# Patient Record
Sex: FEMALE | HISPANIC OR LATINO | Employment: FULL TIME | URBAN - METROPOLITAN AREA
[De-identification: names, ages, dates, MRNs, and addresses within clinical notes are randomized per-mention and may not be internally consistent; named-entity substitution may affect disease eponyms.]

---

## 2016-12-21 LAB — PAP SMEAR - HIM PATIENT REPORTED: NORMAL

## 2017-02-14 ENCOUNTER — COMMUNICATION - HEALTHEAST (OUTPATIENT)
Dept: FAMILY MEDICINE | Facility: CLINIC | Age: 22
End: 2017-02-14

## 2017-02-15 ENCOUNTER — AMBULATORY - HEALTHEAST (OUTPATIENT)
Dept: FAMILY MEDICINE | Facility: CLINIC | Age: 22
End: 2017-02-15

## 2017-02-15 DIAGNOSIS — F41.9 ANXIETY: ICD-10-CM

## 2017-03-03 ENCOUNTER — COMMUNICATION - HEALTHEAST (OUTPATIENT)
Dept: FAMILY MEDICINE | Facility: CLINIC | Age: 22
End: 2017-03-03

## 2017-03-03 DIAGNOSIS — F33.1 MAJOR DEPRESSIVE DISORDER, RECURRENT EPISODE, MODERATE (H): ICD-10-CM

## 2017-03-21 ENCOUNTER — OFFICE VISIT - HEALTHEAST (OUTPATIENT)
Dept: FAMILY MEDICINE | Facility: CLINIC | Age: 22
End: 2017-03-21

## 2017-03-21 DIAGNOSIS — F50.819 BINGE EATING DISORDER: ICD-10-CM

## 2017-03-21 DIAGNOSIS — I10 ESSENTIAL HYPERTENSION WITH GOAL BLOOD PRESSURE LESS THAN 130/80: ICD-10-CM

## 2017-03-21 DIAGNOSIS — F33.1 MAJOR DEPRESSIVE DISORDER, RECURRENT EPISODE, MODERATE (H): ICD-10-CM

## 2017-03-21 DIAGNOSIS — F41.9 ANXIETY: ICD-10-CM

## 2017-03-21 DIAGNOSIS — F50.9: ICD-10-CM

## 2017-03-21 DIAGNOSIS — T74.22XS CONFIRMED VICTIM OF SEXUAL ABUSE IN CHILDHOOD, SEQUELA: ICD-10-CM

## 2017-03-21 DIAGNOSIS — G47.33 OBSTRUCTIVE SLEEP APNEA SYNDROME: ICD-10-CM

## 2017-03-21 ASSESSMENT — MIFFLIN-ST. JEOR: SCORE: 2134.56

## 2017-04-01 ENCOUNTER — COMMUNICATION - HEALTHEAST (OUTPATIENT)
Dept: FAMILY MEDICINE | Facility: CLINIC | Age: 22
End: 2017-04-01

## 2017-04-24 ENCOUNTER — COMMUNICATION - HEALTHEAST (OUTPATIENT)
Dept: FAMILY MEDICINE | Facility: CLINIC | Age: 22
End: 2017-04-24

## 2017-06-26 ENCOUNTER — OFFICE VISIT - HEALTHEAST (OUTPATIENT)
Dept: FAMILY MEDICINE | Facility: CLINIC | Age: 22
End: 2017-06-26

## 2017-06-26 DIAGNOSIS — G47.33 OBSTRUCTIVE SLEEP APNEA SYNDROME: ICD-10-CM

## 2017-06-26 DIAGNOSIS — T74.22XS CONFIRMED VICTIM OF SEXUAL ABUSE IN CHILDHOOD, SEQUELA: ICD-10-CM

## 2017-06-26 DIAGNOSIS — F41.9 ANXIETY: ICD-10-CM

## 2017-06-26 DIAGNOSIS — E55.9 VITAMIN D DEFICIENCY: ICD-10-CM

## 2017-06-26 DIAGNOSIS — I10 ESSENTIAL HYPERTENSION WITH GOAL BLOOD PRESSURE LESS THAN 130/80: ICD-10-CM

## 2017-06-26 DIAGNOSIS — F50.819 BINGE EATING DISORDER: ICD-10-CM

## 2017-06-26 DIAGNOSIS — F33.1 MAJOR DEPRESSIVE DISORDER, RECURRENT EPISODE, MODERATE (H): ICD-10-CM

## 2017-06-26 DIAGNOSIS — E66.01 MORBID OBESITY, UNSPECIFIED OBESITY TYPE (H): ICD-10-CM

## 2017-07-14 ENCOUNTER — COMMUNICATION - HEALTHEAST (OUTPATIENT)
Dept: FAMILY MEDICINE | Facility: CLINIC | Age: 22
End: 2017-07-14

## 2017-08-01 ENCOUNTER — COMMUNICATION - HEALTHEAST (OUTPATIENT)
Dept: FAMILY MEDICINE | Facility: CLINIC | Age: 22
End: 2017-08-01

## 2017-08-25 ENCOUNTER — COMMUNICATION - HEALTHEAST (OUTPATIENT)
Dept: FAMILY MEDICINE | Facility: CLINIC | Age: 22
End: 2017-08-25

## 2017-08-28 ENCOUNTER — RECORDS - HEALTHEAST (OUTPATIENT)
Dept: ADMINISTRATIVE | Facility: OTHER | Age: 22
End: 2017-08-28

## 2017-10-10 ENCOUNTER — COMMUNICATION - HEALTHEAST (OUTPATIENT)
Dept: FAMILY MEDICINE | Facility: CLINIC | Age: 22
End: 2017-10-10

## 2017-10-11 ENCOUNTER — AMBULATORY - HEALTHEAST (OUTPATIENT)
Dept: FAMILY MEDICINE | Facility: CLINIC | Age: 22
End: 2017-10-11

## 2017-10-11 DIAGNOSIS — F33.41 RECURRENT MAJOR DEPRESSIVE DISORDER, IN PARTIAL REMISSION (H): ICD-10-CM

## 2017-10-13 ENCOUNTER — COMMUNICATION - HEALTHEAST (OUTPATIENT)
Dept: FAMILY MEDICINE | Facility: CLINIC | Age: 22
End: 2017-10-13

## 2017-10-18 ENCOUNTER — OFFICE VISIT - HEALTHEAST (OUTPATIENT)
Dept: FAMILY MEDICINE | Facility: CLINIC | Age: 22
End: 2017-10-18

## 2017-10-18 DIAGNOSIS — L42 PITYRIASIS ROSEA: ICD-10-CM

## 2017-10-18 ASSESSMENT — MIFFLIN-ST. JEOR: SCORE: 2192.39

## 2017-12-10 ENCOUNTER — COMMUNICATION - HEALTHEAST (OUTPATIENT)
Dept: FAMILY MEDICINE | Facility: CLINIC | Age: 22
End: 2017-12-10

## 2017-12-10 DIAGNOSIS — F33.1 MAJOR DEPRESSIVE DISORDER, RECURRENT EPISODE, MODERATE (H): ICD-10-CM

## 2017-12-13 ENCOUNTER — COMMUNICATION - HEALTHEAST (OUTPATIENT)
Dept: FAMILY MEDICINE | Facility: CLINIC | Age: 22
End: 2017-12-13

## 2017-12-13 DIAGNOSIS — E55.9 VITAMIN D DEFICIENCY: ICD-10-CM

## 2018-01-04 ENCOUNTER — COMMUNICATION - HEALTHEAST (OUTPATIENT)
Dept: FAMILY MEDICINE | Facility: CLINIC | Age: 23
End: 2018-01-04

## 2018-01-04 DIAGNOSIS — I10 ESSENTIAL HYPERTENSION: ICD-10-CM

## 2018-03-13 ENCOUNTER — COMMUNICATION - HEALTHEAST (OUTPATIENT)
Dept: FAMILY MEDICINE | Facility: CLINIC | Age: 23
End: 2018-03-13

## 2018-03-15 ENCOUNTER — COMMUNICATION - HEALTHEAST (OUTPATIENT)
Dept: FAMILY MEDICINE | Facility: CLINIC | Age: 23
End: 2018-03-15

## 2018-03-20 ENCOUNTER — COMMUNICATION - HEALTHEAST (OUTPATIENT)
Dept: FAMILY MEDICINE | Facility: CLINIC | Age: 23
End: 2018-03-20

## 2018-06-01 ENCOUNTER — RECORDS - HEALTHEAST (OUTPATIENT)
Dept: ADMINISTRATIVE | Facility: OTHER | Age: 23
End: 2018-06-01

## 2018-06-01 ENCOUNTER — COMMUNICATION - HEALTHEAST (OUTPATIENT)
Dept: FAMILY MEDICINE | Facility: CLINIC | Age: 23
End: 2018-06-01

## 2018-06-11 ENCOUNTER — OFFICE VISIT - HEALTHEAST (OUTPATIENT)
Dept: FAMILY MEDICINE | Facility: CLINIC | Age: 23
End: 2018-06-11

## 2018-06-11 DIAGNOSIS — R42 DIZZINESS: ICD-10-CM

## 2018-06-11 LAB
ERYTHROCYTE [DISTWIDTH] IN BLOOD BY AUTOMATED COUNT: 12.4 % (ref 11–14.5)
HCT VFR BLD AUTO: 40.3 % (ref 35–47)
HGB BLD-MCNC: 13.6 G/DL (ref 12–16)
MCH RBC QN AUTO: 29.2 PG (ref 27–34)
MCHC RBC AUTO-ENTMCNC: 33.7 G/DL (ref 32–36)
MCV RBC AUTO: 87 FL (ref 80–100)
PLATELET # BLD AUTO: 308 THOU/UL (ref 140–440)
PMV BLD AUTO: 7.8 FL (ref 7–10)
RBC # BLD AUTO: 4.65 MILL/UL (ref 3.8–5.4)
WBC: 8.9 THOU/UL (ref 4–11)

## 2018-06-11 ASSESSMENT — MIFFLIN-ST. JEOR: SCORE: 2177.65

## 2018-06-12 ENCOUNTER — COMMUNICATION - HEALTHEAST (OUTPATIENT)
Dept: FAMILY MEDICINE | Facility: CLINIC | Age: 23
End: 2018-06-12

## 2018-06-12 DIAGNOSIS — E55.9 VITAMIN D DEFICIENCY: ICD-10-CM

## 2018-06-12 LAB
ALBUMIN SERPL-MCNC: 3.9 G/DL (ref 3.5–5)
ALP SERPL-CCNC: 93 U/L (ref 45–120)
ALT SERPL W P-5'-P-CCNC: 31 U/L (ref 0–45)
ANION GAP SERPL CALCULATED.3IONS-SCNC: 11 MMOL/L (ref 5–18)
AST SERPL W P-5'-P-CCNC: 20 U/L (ref 0–40)
BILIRUB SERPL-MCNC: 0.5 MG/DL (ref 0–1)
BUN SERPL-MCNC: 12 MG/DL (ref 8–22)
CALCIUM SERPL-MCNC: 9.7 MG/DL (ref 8.5–10.5)
CHLORIDE BLD-SCNC: 105 MMOL/L (ref 98–107)
CO2 SERPL-SCNC: 26 MMOL/L (ref 22–31)
CREAT SERPL-MCNC: 0.78 MG/DL (ref 0.6–1.1)
GFR SERPL CREATININE-BSD FRML MDRD: >60 ML/MIN/1.73M2
GLUCOSE BLD-MCNC: 88 MG/DL (ref 70–125)
POTASSIUM BLD-SCNC: 4.7 MMOL/L (ref 3.5–5)
PROT SERPL-MCNC: 7 G/DL (ref 6–8)
SODIUM SERPL-SCNC: 142 MMOL/L (ref 136–145)
TSH SERPL DL<=0.005 MIU/L-ACNC: 1.47 UIU/ML (ref 0.3–5)
VIT B12 SERPL-MCNC: 480 PG/ML (ref 213–816)

## 2018-06-13 ENCOUNTER — AMBULATORY - HEALTHEAST (OUTPATIENT)
Dept: FAMILY MEDICINE | Facility: CLINIC | Age: 23
End: 2018-06-13

## 2018-06-13 ENCOUNTER — COMMUNICATION - HEALTHEAST (OUTPATIENT)
Dept: FAMILY MEDICINE | Facility: CLINIC | Age: 23
End: 2018-06-13

## 2018-09-07 ENCOUNTER — COMMUNICATION - HEALTHEAST (OUTPATIENT)
Dept: FAMILY MEDICINE | Facility: CLINIC | Age: 23
End: 2018-09-07

## 2018-09-08 ENCOUNTER — COMMUNICATION - HEALTHEAST (OUTPATIENT)
Dept: FAMILY MEDICINE | Facility: CLINIC | Age: 23
End: 2018-09-08

## 2018-09-08 DIAGNOSIS — I10 ESSENTIAL HYPERTENSION: ICD-10-CM

## 2018-09-10 ENCOUNTER — AMBULATORY - HEALTHEAST (OUTPATIENT)
Dept: FAMILY MEDICINE | Facility: CLINIC | Age: 23
End: 2018-09-10

## 2018-09-10 DIAGNOSIS — F51.01 PRIMARY INSOMNIA: ICD-10-CM

## 2018-09-24 ENCOUNTER — COMMUNICATION - HEALTHEAST (OUTPATIENT)
Dept: ADMINISTRATIVE | Facility: CLINIC | Age: 23
End: 2018-09-24

## 2018-10-11 ENCOUNTER — COMMUNICATION - HEALTHEAST (OUTPATIENT)
Dept: FAMILY MEDICINE | Facility: CLINIC | Age: 23
End: 2018-10-11

## 2018-10-11 DIAGNOSIS — F33.41 RECURRENT MAJOR DEPRESSIVE DISORDER, IN PARTIAL REMISSION (H): ICD-10-CM

## 2018-11-04 ENCOUNTER — COMMUNICATION - HEALTHEAST (OUTPATIENT)
Dept: FAMILY MEDICINE | Facility: CLINIC | Age: 23
End: 2018-11-04

## 2018-11-13 ENCOUNTER — RECORDS - HEALTHEAST (OUTPATIENT)
Dept: ADMINISTRATIVE | Facility: OTHER | Age: 23
End: 2018-11-13

## 2018-11-15 ENCOUNTER — RECORDS - HEALTHEAST (OUTPATIENT)
Dept: ADMINISTRATIVE | Facility: OTHER | Age: 23
End: 2018-11-15

## 2018-11-15 ENCOUNTER — COMMUNICATION - HEALTHEAST (OUTPATIENT)
Dept: SCHEDULING | Facility: CLINIC | Age: 23
End: 2018-11-15

## 2018-11-16 ENCOUNTER — OFFICE VISIT - HEALTHEAST (OUTPATIENT)
Dept: FAMILY MEDICINE | Facility: CLINIC | Age: 23
End: 2018-11-16

## 2018-11-16 DIAGNOSIS — F33.1 MAJOR DEPRESSIVE DISORDER, RECURRENT EPISODE, MODERATE (H): ICD-10-CM

## 2018-11-16 DIAGNOSIS — I10 ESSENTIAL HYPERTENSION: ICD-10-CM

## 2018-11-16 DIAGNOSIS — E66.01 MORBID OBESITY (H): ICD-10-CM

## 2018-11-16 ASSESSMENT — MIFFLIN-ST. JEOR: SCORE: 2174.24

## 2018-12-08 ENCOUNTER — COMMUNICATION - HEALTHEAST (OUTPATIENT)
Dept: FAMILY MEDICINE | Facility: CLINIC | Age: 23
End: 2018-12-08

## 2018-12-08 DIAGNOSIS — E55.9 VITAMIN D DEFICIENCY: ICD-10-CM

## 2018-12-18 ENCOUNTER — COMMUNICATION - HEALTHEAST (OUTPATIENT)
Dept: FAMILY MEDICINE | Facility: CLINIC | Age: 23
End: 2018-12-18

## 2019-01-08 ENCOUNTER — COMMUNICATION - HEALTHEAST (OUTPATIENT)
Dept: FAMILY MEDICINE | Facility: CLINIC | Age: 24
End: 2019-01-08

## 2019-01-19 ENCOUNTER — COMMUNICATION - HEALTHEAST (OUTPATIENT)
Dept: FAMILY MEDICINE | Facility: CLINIC | Age: 24
End: 2019-01-19

## 2019-03-14 ENCOUNTER — COMMUNICATION - HEALTHEAST (OUTPATIENT)
Dept: FAMILY MEDICINE | Facility: CLINIC | Age: 24
End: 2019-03-14

## 2019-03-15 ENCOUNTER — OFFICE VISIT - HEALTHEAST (OUTPATIENT)
Dept: FAMILY MEDICINE | Facility: CLINIC | Age: 24
End: 2019-03-15

## 2019-03-15 DIAGNOSIS — A08.4 VIRAL GASTROENTERITIS: ICD-10-CM

## 2019-04-05 ENCOUNTER — OFFICE VISIT - HEALTHEAST (OUTPATIENT)
Dept: FAMILY MEDICINE | Facility: CLINIC | Age: 24
End: 2019-04-05

## 2019-04-05 DIAGNOSIS — F33.1 MAJOR DEPRESSIVE DISORDER, RECURRENT EPISODE, MODERATE (H): ICD-10-CM

## 2019-04-05 DIAGNOSIS — I10 ESSENTIAL HYPERTENSION: ICD-10-CM

## 2019-04-05 DIAGNOSIS — Z97.5 IUD (INTRAUTERINE DEVICE) IN PLACE: ICD-10-CM

## 2019-04-05 DIAGNOSIS — Z76.89 ESTABLISHING CARE WITH NEW DOCTOR, ENCOUNTER FOR: ICD-10-CM

## 2019-04-05 DIAGNOSIS — E66.01 MORBID OBESITY (H): ICD-10-CM

## 2019-04-05 DIAGNOSIS — F32.A DEPRESSION, UNSPECIFIED DEPRESSION TYPE: ICD-10-CM

## 2019-04-05 DIAGNOSIS — G47.30 SLEEP APNEA, UNSPECIFIED TYPE: ICD-10-CM

## 2019-04-05 DIAGNOSIS — E78.00 PURE HYPERCHOLESTEROLEMIA: ICD-10-CM

## 2019-04-05 DIAGNOSIS — E55.9 VITAMIN D DEFICIENCY: ICD-10-CM

## 2019-04-05 DIAGNOSIS — G47.00 INSOMNIA, UNSPECIFIED TYPE: ICD-10-CM

## 2019-04-05 ASSESSMENT — MIFFLIN-ST. JEOR: SCORE: 2102.8

## 2019-04-07 ENCOUNTER — COMMUNICATION - HEALTHEAST (OUTPATIENT)
Dept: FAMILY MEDICINE | Facility: CLINIC | Age: 24
End: 2019-04-07

## 2019-04-07 DIAGNOSIS — F32.A DEPRESSION, UNSPECIFIED DEPRESSION TYPE: ICD-10-CM

## 2019-04-12 ENCOUNTER — RECORDS - HEALTHEAST (OUTPATIENT)
Dept: HEALTH INFORMATION MANAGEMENT | Facility: CLINIC | Age: 24
End: 2019-04-12

## 2019-05-28 ENCOUNTER — OFFICE VISIT - HEALTHEAST (OUTPATIENT)
Dept: SLEEP MEDICINE | Facility: CLINIC | Age: 24
End: 2019-05-28

## 2019-05-28 DIAGNOSIS — R29.818 SUSPECTED SLEEP APNEA: ICD-10-CM

## 2019-05-28 DIAGNOSIS — R06.83 SNORING: ICD-10-CM

## 2019-05-28 DIAGNOSIS — E66.813 CLASS 3 SEVERE OBESITY WITHOUT SERIOUS COMORBIDITY IN ADULT, UNSPECIFIED BMI, UNSPECIFIED OBESITY TYPE (H): ICD-10-CM

## 2019-05-28 DIAGNOSIS — G47.10 HYPERSOMNIA: ICD-10-CM

## 2019-05-28 DIAGNOSIS — E66.01 CLASS 3 SEVERE OBESITY WITHOUT SERIOUS COMORBIDITY IN ADULT, UNSPECIFIED BMI, UNSPECIFIED OBESITY TYPE (H): ICD-10-CM

## 2019-05-28 ASSESSMENT — MIFFLIN-ST. JEOR: SCORE: 2102.8

## 2019-06-28 ENCOUNTER — RECORDS - HEALTHEAST (OUTPATIENT)
Dept: ADMINISTRATIVE | Facility: OTHER | Age: 24
End: 2019-06-28

## 2019-06-28 ENCOUNTER — RECORDS - HEALTHEAST (OUTPATIENT)
Dept: SLEEP MEDICINE | Facility: CLINIC | Age: 24
End: 2019-06-28

## 2019-06-28 DIAGNOSIS — R29.818 OTHER SYMPTOMS AND SIGNS INVOLVING THE NERVOUS SYSTEM: ICD-10-CM

## 2019-06-28 DIAGNOSIS — R06.83 SNORING: ICD-10-CM

## 2019-06-28 DIAGNOSIS — G47.10 HYPERSOMNIA, UNSPECIFIED: ICD-10-CM

## 2019-06-28 DIAGNOSIS — E66.01 MORBID (SEVERE) OBESITY DUE TO EXCESS CALORIES (H): ICD-10-CM

## 2019-07-08 ENCOUNTER — COMMUNICATION - HEALTHEAST (OUTPATIENT)
Dept: SLEEP MEDICINE | Facility: CLINIC | Age: 24
End: 2019-07-08

## 2019-07-15 ENCOUNTER — COMMUNICATION - HEALTHEAST (OUTPATIENT)
Dept: SLEEP MEDICINE | Facility: CLINIC | Age: 24
End: 2019-07-15

## 2019-09-03 ENCOUNTER — COMMUNICATION - HEALTHEAST (OUTPATIENT)
Dept: FAMILY MEDICINE | Facility: CLINIC | Age: 24
End: 2019-09-03

## 2019-12-09 ENCOUNTER — COMMUNICATION - HEALTHEAST (OUTPATIENT)
Dept: FAMILY MEDICINE | Facility: CLINIC | Age: 24
End: 2019-12-09

## 2019-12-10 ENCOUNTER — OFFICE VISIT (OUTPATIENT)
Dept: URGENT CARE | Facility: URGENT CARE | Age: 24
End: 2019-12-10
Payer: COMMERCIAL

## 2019-12-10 VITALS
SYSTOLIC BLOOD PRESSURE: 146 MMHG | TEMPERATURE: 97.5 F | RESPIRATION RATE: 23 BRPM | HEIGHT: 67 IN | DIASTOLIC BLOOD PRESSURE: 103 MMHG | WEIGHT: 270 LBS | OXYGEN SATURATION: 98 % | HEART RATE: 85 BPM | BODY MASS INDEX: 42.38 KG/M2

## 2019-12-10 DIAGNOSIS — R06.2 WHEEZING: ICD-10-CM

## 2019-12-10 DIAGNOSIS — R05.8 PRODUCTIVE COUGH: Primary | ICD-10-CM

## 2019-12-10 DIAGNOSIS — J30.89 NON-SEASONAL ALLERGIC RHINITIS DUE TO OTHER ALLERGIC TRIGGER: ICD-10-CM

## 2019-12-10 PROBLEM — I10 HYPERTENSION: Status: ACTIVE | Noted: 2019-12-10

## 2019-12-10 PROBLEM — F32.A DEPRESSION: Status: ACTIVE | Noted: 2019-12-10

## 2019-12-10 PROBLEM — F43.10 PTSD (POST-TRAUMATIC STRESS DISORDER): Status: ACTIVE | Noted: 2019-12-10

## 2019-12-10 PROCEDURE — 99203 OFFICE O/P NEW LOW 30 MIN: CPT | Performed by: PHYSICIAN ASSISTANT

## 2019-12-10 RX ORDER — CETIRIZINE HYDROCHLORIDE 10 MG/1
10 TABLET ORAL EVERY EVENING
Qty: 30 TABLET | Refills: 3 | Status: SHIPPED | OUTPATIENT
Start: 2019-12-10 | End: 2021-08-18

## 2019-12-10 RX ORDER — DULOXETIN HYDROCHLORIDE 60 MG/1
60 CAPSULE, DELAYED RELEASE ORAL
COMMUNITY
Start: 2019-04-05 | End: 2022-01-05

## 2019-12-10 RX ORDER — LOSARTAN POTASSIUM 50 MG/1
50 TABLET ORAL
COMMUNITY
Start: 2019-04-05 | End: 2022-04-07

## 2019-12-10 RX ORDER — ALBUTEROL SULFATE 90 UG/1
2 AEROSOL, METERED RESPIRATORY (INHALATION) EVERY 6 HOURS
Qty: 18 G | Refills: 0 | Status: SHIPPED | OUTPATIENT
Start: 2019-12-10 | End: 2022-01-05

## 2019-12-10 RX ORDER — FLUTICASONE PROPIONATE 50 MCG
2 SPRAY, SUSPENSION (ML) NASAL DAILY
Qty: 16 G | Refills: 0 | Status: SHIPPED | OUTPATIENT
Start: 2019-12-10 | End: 2020-01-12

## 2019-12-10 RX ORDER — DOXYCYCLINE 100 MG/1
100 CAPSULE ORAL 2 TIMES DAILY
Qty: 20 CAPSULE | Refills: 0 | Status: SHIPPED | OUTPATIENT
Start: 2019-12-10 | End: 2019-12-20

## 2019-12-10 ASSESSMENT — MIFFLIN-ST. JEOR: SCORE: 2002.34

## 2019-12-10 NOTE — PROGRESS NOTES
"Patient presents with:  Urgent Care: cough, rattle in chest, wheezing and sob for one month.       SUBJECTIVE:   Liz June is a 24 year old female presenting with a chief complaint of:  1) productive cough for the past month.   No fevers  2) wheezing.  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    Onset of symptoms was as above.  Course of illness is worsening.    Severity moderate  Current and Associated symptoms: as above  Treatment measures tried include albuterol inhaler and mucinex..  Predisposing factors include no flu shot.  Non smoker    No past medical history on file.   Denies any significant PMH    (epic shows HTN and Sleep apnea)    FH:  Paternal grandmother CHF  Father brain cancer  Paternal aunt and uncle diabetes  Paternal grandfather Leukemia    There is no problem list on file for this patient.    Social History     Tobacco Use     Smoking status: Former Smoker     Types: Cigarettes     Smokeless tobacco: Never Used   Substance Use Topics     Alcohol use: Not on file       ROS:  CONSTITUTIONAL:NEGATIVE for fever, chills, change in weight  INTEGUMENTARY/SKIN: NEGATIVE for worrisome rashes, moles or lesions  EYES: NEGATIVE for vision changes or irritation  ENT/MOUTH: as per HPI  RESP:as per HPI  CV: NEGATIVE for chest pain, palpitations or peripheral edema  GI: NEGATIVE for nausea, abdominal pain, heartburn, or change in bowel habits  : normal menstrual cycles  MUSCULOSKELETAL: NEGATIVE for significant arthralgias or myalgia  NEURO: NEGATIVE for weakness, dizziness or paresthesias  ENDOCRINE: NEGATIVE for temperature intolerance, skin/hair changes  Review of systems negative except as stated above.    OBJECTIVE  :BP (!) 146/103   Pulse 85   Temp 97.5  F (36.4  C) (Oral)   Resp 23   Ht 1.702 m (5' 7\")   Wt 122.5 kg (270 lb)   SpO2 98%   BMI 42.29 kg/m    GENERAL APPEARANCE: healthy, alert and no distress  EYES: EOMI,  PERRL, conjunctiva " clear  HENT: ear canals and TM's normal.  Nose and mouth without ulcers, erythema or lesions  HENT: nasal turbinates boggy with bluish hue and rhinorrhea clear  NECK: supple, nontender, no lymphadenopathy  RESP: lungs clear to auscultation - no rales, rhonchi or wheezes  CV: regular rates and rhythm, normal S1 S2, no murmur noted  ABDOMEN:  soft, nontender, no HSM or masses and bowel sounds normal  NEURO: Normal strength and tone, sensory exam grossly normal,  normal speech and mentation  SKIN: no suspicious lesions or rashes    (R05) Productive cough  (primary encounter diagnosis)  Comment:   Plan: doxycycline hyclate (VIBRAMYCIN) 100 MG capsule            (J30.89) Non-seasonal allergic rhinitis due to other allergic trigger  Comment:   Plan: fluticasone (FLONASE) 50 MCG/ACT nasal spray,         cetirizine (ZYRTEC) 10 MG tablet          Saline nasal spray in the morning.      (R06.2) Wheezing  Comment:   Plan: albuterol (PROAIR HFA/PROVENTIL HFA/VENTOLIN         HFA) 108 (90 Base) MCG/ACT inhaler          Follow up with primary clinic should symptoms persist or worsen.

## 2019-12-14 ENCOUNTER — COMMUNICATION - HEALTHEAST (OUTPATIENT)
Dept: FAMILY MEDICINE | Facility: CLINIC | Age: 24
End: 2019-12-14

## 2019-12-14 DIAGNOSIS — F32.A DEPRESSION, UNSPECIFIED DEPRESSION TYPE: ICD-10-CM

## 2019-12-14 DIAGNOSIS — E55.9 VITAMIN D DEFICIENCY: ICD-10-CM

## 2019-12-14 DIAGNOSIS — I10 ESSENTIAL HYPERTENSION: ICD-10-CM

## 2019-12-21 ENCOUNTER — RECORDS - HEALTHEAST (OUTPATIENT)
Dept: ADMINISTRATIVE | Facility: OTHER | Age: 24
End: 2019-12-21

## 2019-12-31 ENCOUNTER — COMMUNICATION - HEALTHEAST (OUTPATIENT)
Dept: FAMILY MEDICINE | Facility: CLINIC | Age: 24
End: 2019-12-31

## 2020-01-01 DIAGNOSIS — J30.89 NON-SEASONAL ALLERGIC RHINITIS DUE TO OTHER ALLERGIC TRIGGER: ICD-10-CM

## 2020-01-12 RX ORDER — FLUTICASONE PROPIONATE 50 MCG
SPRAY, SUSPENSION (ML) NASAL
Qty: 16 ML | Refills: 0 | Status: SHIPPED | OUTPATIENT
Start: 2020-01-12 | End: 2021-09-26

## 2020-01-15 ENCOUNTER — COMMUNICATION - HEALTHEAST (OUTPATIENT)
Dept: FAMILY MEDICINE | Facility: CLINIC | Age: 25
End: 2020-01-15

## 2020-01-29 ENCOUNTER — OFFICE VISIT - HEALTHEAST (OUTPATIENT)
Dept: FAMILY MEDICINE | Facility: CLINIC | Age: 25
End: 2020-01-29

## 2020-01-29 ENCOUNTER — COMMUNICATION - HEALTHEAST (OUTPATIENT)
Dept: FAMILY MEDICINE | Facility: CLINIC | Age: 25
End: 2020-01-29

## 2020-01-29 DIAGNOSIS — J35.8 TONSILLITH: ICD-10-CM

## 2020-01-29 DIAGNOSIS — J03.91 RECURRENT TONSILLITIS: ICD-10-CM

## 2020-01-29 DIAGNOSIS — J02.9 SORETHROAT: ICD-10-CM

## 2020-01-29 LAB — DEPRECATED S PYO AG THROAT QL EIA: NORMAL

## 2020-01-29 ASSESSMENT — MIFFLIN-ST. JEOR: SCORE: 2033.78

## 2020-01-30 LAB — GROUP A STREP BY PCR: NORMAL

## 2020-03-16 ENCOUNTER — COMMUNICATION - HEALTHEAST (OUTPATIENT)
Dept: FAMILY MEDICINE | Facility: CLINIC | Age: 25
End: 2020-03-16

## 2020-03-19 ENCOUNTER — COMMUNICATION - HEALTHEAST (OUTPATIENT)
Dept: FAMILY MEDICINE | Facility: CLINIC | Age: 25
End: 2020-03-19

## 2020-03-19 DIAGNOSIS — E55.9 VITAMIN D DEFICIENCY: ICD-10-CM

## 2020-03-19 DIAGNOSIS — R05.9 COUGH: ICD-10-CM

## 2020-04-12 ENCOUNTER — COMMUNICATION - HEALTHEAST (OUTPATIENT)
Dept: FAMILY MEDICINE | Facility: CLINIC | Age: 25
End: 2020-04-12

## 2020-04-12 DIAGNOSIS — F32.A DEPRESSION, UNSPECIFIED DEPRESSION TYPE: ICD-10-CM

## 2020-04-12 DIAGNOSIS — I10 ESSENTIAL HYPERTENSION: ICD-10-CM

## 2020-04-28 ENCOUNTER — COMMUNICATION - HEALTHEAST (OUTPATIENT)
Dept: FAMILY MEDICINE | Facility: CLINIC | Age: 25
End: 2020-04-28

## 2020-04-29 ENCOUNTER — OFFICE VISIT - HEALTHEAST (OUTPATIENT)
Dept: FAMILY MEDICINE | Facility: CLINIC | Age: 25
End: 2020-04-29

## 2020-04-29 DIAGNOSIS — F41.9 ANXIETY: ICD-10-CM

## 2020-04-29 DIAGNOSIS — R05.9 COUGH: ICD-10-CM

## 2020-04-29 DIAGNOSIS — E55.9 VITAMIN D DEFICIENCY: ICD-10-CM

## 2020-04-29 DIAGNOSIS — I10 ESSENTIAL HYPERTENSION: ICD-10-CM

## 2020-04-29 ASSESSMENT — ANXIETY QUESTIONNAIRES
7. FEELING AFRAID AS IF SOMETHING AWFUL MIGHT HAPPEN: NEARLY EVERY DAY
2. NOT BEING ABLE TO STOP OR CONTROL WORRYING: NEARLY EVERY DAY
1. FEELING NERVOUS, ANXIOUS, OR ON EDGE: NEARLY EVERY DAY
3. WORRYING TOO MUCH ABOUT DIFFERENT THINGS: NEARLY EVERY DAY
5. BEING SO RESTLESS THAT IT IS HARD TO SIT STILL: MORE THAN HALF THE DAYS
GAD7 TOTAL SCORE: 19
6. BECOMING EASILY ANNOYED OR IRRITABLE: MORE THAN HALF THE DAYS
IF YOU CHECKED OFF ANY PROBLEMS ON THIS QUESTIONNAIRE, HOW DIFFICULT HAVE THESE PROBLEMS MADE IT FOR YOU TO DO YOUR WORK, TAKE CARE OF THINGS AT HOME, OR GET ALONG WITH OTHER PEOPLE: VERY DIFFICULT
4. TROUBLE RELAXING: NEARLY EVERY DAY

## 2020-04-29 ASSESSMENT — PATIENT HEALTH QUESTIONNAIRE - PHQ9: SUM OF ALL RESPONSES TO PHQ QUESTIONS 1-9: 21

## 2020-08-23 ENCOUNTER — COMMUNICATION - HEALTHEAST (OUTPATIENT)
Dept: FAMILY MEDICINE | Facility: CLINIC | Age: 25
End: 2020-08-23

## 2020-08-23 DIAGNOSIS — K64.9 HEMORRHOIDS, UNSPECIFIED HEMORRHOID TYPE: ICD-10-CM

## 2020-09-11 ENCOUNTER — COMMUNICATION - HEALTHEAST (OUTPATIENT)
Dept: FAMILY MEDICINE | Facility: CLINIC | Age: 25
End: 2020-09-11

## 2020-10-12 ENCOUNTER — COMMUNICATION - HEALTHEAST (OUTPATIENT)
Dept: FAMILY MEDICINE | Facility: CLINIC | Age: 25
End: 2020-10-12

## 2020-10-22 ENCOUNTER — OFFICE VISIT - HEALTHEAST (OUTPATIENT)
Dept: FAMILY MEDICINE | Facility: CLINIC | Age: 25
End: 2020-10-22

## 2020-10-22 DIAGNOSIS — F41.9 ANXIETY: ICD-10-CM

## 2020-10-22 DIAGNOSIS — F33.2 SEVERE EPISODE OF RECURRENT MAJOR DEPRESSIVE DISORDER, WITHOUT PSYCHOTIC FEATURES (H): ICD-10-CM

## 2020-11-03 ENCOUNTER — COMMUNICATION - HEALTHEAST (OUTPATIENT)
Dept: SCHEDULING | Facility: CLINIC | Age: 25
End: 2020-11-03

## 2020-11-03 ENCOUNTER — COMMUNICATION - HEALTHEAST (OUTPATIENT)
Dept: FAMILY MEDICINE | Facility: CLINIC | Age: 25
End: 2020-11-03

## 2020-11-03 DIAGNOSIS — R05.9 COUGH: ICD-10-CM

## 2020-11-05 ENCOUNTER — OFFICE VISIT - HEALTHEAST (OUTPATIENT)
Dept: FAMILY MEDICINE | Facility: CLINIC | Age: 25
End: 2020-11-05

## 2020-11-05 DIAGNOSIS — F41.9 ANXIETY: ICD-10-CM

## 2020-11-05 DIAGNOSIS — R61 GENERALIZED HYPERHIDROSIS: ICD-10-CM

## 2020-11-05 DIAGNOSIS — L73.2 HIDRADENITIS SUPPURATIVA: ICD-10-CM

## 2020-11-17 ENCOUNTER — COMMUNICATION - HEALTHEAST (OUTPATIENT)
Dept: FAMILY MEDICINE | Facility: CLINIC | Age: 25
End: 2020-11-17

## 2020-11-17 DIAGNOSIS — F32.A ANXIETY AND DEPRESSION: ICD-10-CM

## 2020-11-17 DIAGNOSIS — F41.9 ANXIETY AND DEPRESSION: ICD-10-CM

## 2021-01-11 ENCOUNTER — COMMUNICATION - HEALTHEAST (OUTPATIENT)
Dept: FAMILY MEDICINE | Facility: CLINIC | Age: 26
End: 2021-01-11

## 2021-01-12 ENCOUNTER — COMMUNICATION - HEALTHEAST (OUTPATIENT)
Dept: FAMILY MEDICINE | Facility: CLINIC | Age: 26
End: 2021-01-12

## 2021-01-12 DIAGNOSIS — F33.2 SEVERE EPISODE OF RECURRENT MAJOR DEPRESSIVE DISORDER, WITHOUT PSYCHOTIC FEATURES (H): ICD-10-CM

## 2021-01-14 ENCOUNTER — OFFICE VISIT - HEALTHEAST (OUTPATIENT)
Dept: FAMILY MEDICINE | Facility: CLINIC | Age: 26
End: 2021-01-14

## 2021-01-14 DIAGNOSIS — Z13.220 LIPID SCREENING: ICD-10-CM

## 2021-01-14 DIAGNOSIS — I10 ESSENTIAL HYPERTENSION: ICD-10-CM

## 2021-01-14 DIAGNOSIS — Z13.1 SCREENING FOR DIABETES MELLITUS: ICD-10-CM

## 2021-01-14 DIAGNOSIS — U07.1 2019 NOVEL CORONAVIRUS DISEASE (COVID-19): ICD-10-CM

## 2021-01-21 ENCOUNTER — COMMUNICATION - HEALTHEAST (OUTPATIENT)
Dept: FAMILY MEDICINE | Facility: CLINIC | Age: 26
End: 2021-01-21

## 2021-01-21 DIAGNOSIS — R06.2 WHEEZING: ICD-10-CM

## 2021-01-21 DIAGNOSIS — R11.0 NAUSEA: ICD-10-CM

## 2021-02-10 ENCOUNTER — COMMUNICATION - HEALTHEAST (OUTPATIENT)
Dept: FAMILY MEDICINE | Facility: CLINIC | Age: 26
End: 2021-02-10

## 2021-02-10 DIAGNOSIS — G47.00 INSOMNIA, UNSPECIFIED TYPE: ICD-10-CM

## 2021-03-14 ENCOUNTER — COMMUNICATION - HEALTHEAST (OUTPATIENT)
Dept: FAMILY MEDICINE | Facility: CLINIC | Age: 26
End: 2021-03-14

## 2021-03-14 DIAGNOSIS — E66.01 CLASS 3 SEVERE OBESITY WITH BODY MASS INDEX (BMI) OF 40.0 TO 44.9 IN ADULT, UNSPECIFIED OBESITY TYPE, UNSPECIFIED WHETHER SERIOUS COMORBIDITY PRESENT (H): ICD-10-CM

## 2021-03-14 DIAGNOSIS — E66.813 CLASS 3 SEVERE OBESITY WITH BODY MASS INDEX (BMI) OF 40.0 TO 44.9 IN ADULT, UNSPECIFIED OBESITY TYPE, UNSPECIFIED WHETHER SERIOUS COMORBIDITY PRESENT (H): ICD-10-CM

## 2021-03-23 ENCOUNTER — AMBULATORY - HEALTHEAST (OUTPATIENT)
Dept: FAMILY MEDICINE | Facility: CLINIC | Age: 26
End: 2021-03-23

## 2021-03-23 ENCOUNTER — COMMUNICATION - HEALTHEAST (OUTPATIENT)
Dept: FAMILY MEDICINE | Facility: CLINIC | Age: 26
End: 2021-03-23

## 2021-03-23 DIAGNOSIS — G47.00 INSOMNIA, UNSPECIFIED TYPE: ICD-10-CM

## 2021-03-24 ENCOUNTER — AMBULATORY - HEALTHEAST (OUTPATIENT)
Dept: NURSING | Facility: CLINIC | Age: 26
End: 2021-03-24

## 2021-04-08 ENCOUNTER — OFFICE VISIT - HEALTHEAST (OUTPATIENT)
Dept: FAMILY MEDICINE | Facility: CLINIC | Age: 26
End: 2021-04-08

## 2021-04-08 DIAGNOSIS — F33.1 MAJOR DEPRESSIVE DISORDER, RECURRENT EPISODE, MODERATE (H): ICD-10-CM

## 2021-04-08 DIAGNOSIS — L71.0 PERIORAL DERMATITIS: ICD-10-CM

## 2021-04-08 ASSESSMENT — PATIENT HEALTH QUESTIONNAIRE - PHQ9: SUM OF ALL RESPONSES TO PHQ QUESTIONS 1-9: 19

## 2021-04-14 ENCOUNTER — AMBULATORY - HEALTHEAST (OUTPATIENT)
Dept: NURSING | Facility: CLINIC | Age: 26
End: 2021-04-14

## 2021-05-03 ENCOUNTER — OFFICE VISIT - HEALTHEAST (OUTPATIENT)
Dept: FAMILY MEDICINE | Facility: CLINIC | Age: 26
End: 2021-05-03

## 2021-05-03 DIAGNOSIS — F32.A DEPRESSION, UNSPECIFIED DEPRESSION TYPE: ICD-10-CM

## 2021-05-25 ENCOUNTER — COMMUNICATION - HEALTHEAST (OUTPATIENT)
Dept: FAMILY MEDICINE | Facility: CLINIC | Age: 26
End: 2021-05-25

## 2021-05-25 DIAGNOSIS — E55.9 VITAMIN D DEFICIENCY: ICD-10-CM

## 2021-05-27 ASSESSMENT — PATIENT HEALTH QUESTIONNAIRE - PHQ9
SUM OF ALL RESPONSES TO PHQ QUESTIONS 1-9: 19
SUM OF ALL RESPONSES TO PHQ QUESTIONS 1-9: 21
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
## 2021-05-27 NOTE — TELEPHONE ENCOUNTER
RN cannot approve Refill Request    RN can NOT refill this medication medication not on med list.   Pt has 60 mg on med list    [place], [facility] Connection Triage/Med Refill 4/8/2019    Requested Prescriptions   Pending Prescriptions Disp Refills     DULoxetine (CYMBALTA) 30 MG capsule [Pharmacy Med Name: DULOXETINE HCL DR 30 MG CAP] 60 capsule 3     Sig: TAKE 2 CAPSULES BY MOUTH EVERY DAY       Tricyclics/Misc Antidepressant/Antianxiety Meds Refill Protocol Passed - 4/7/2019  7:32 AM        Passed - PCP or prescribing provider visit in last year     Last office visit with prescriber/PCP: Visit date not found OR same dept: 4/5/2019 Kaylee Nugent MD OR same specialty: 4/5/2019 Kaylee Nugent MD  Last physical: Visit date not found Last MTM visit: Visit date not found   Next visit within 3 mo: Visit date not found  Next physical within 3 mo: Visit date not found  Prescriber OR PCP: Brandi Randolph MD  Last diagnosis associated with med order: There are no diagnoses linked to this encounter.  If protocol passes may refill for 12 months if within 3 months of last provider visit (or a total of 15 months).

## 2021-05-28 ASSESSMENT — ANXIETY QUESTIONNAIRES: GAD7 TOTAL SCORE: 19

## 2021-05-29 NOTE — PROGRESS NOTES
Dear Dr. Fernandez, [facility]  [address] [place] [address]    Thank you for the opportunity to participate in the care of . Liz June.    She is a 23 y.o. female who comes to the clinic with a chief complaint of excessive daytime sleepiness that has been going on for more than 5 years.  The patient has been informed by his sister that he has significant pauses in her breathing during sleep followed by loud snoring.  She also feels tired despite adequate hours of sleep.  Patient's review of system is otherwise negative.     Ideal Sleep-Wake Cycle(devoid of societal pressure):    Patient would try to initiate sleep at around midnight with a sleep latency of 30 minutes. The patient would have 2-3 awakenings. Final wake up time is around 9 AM.    Past Medical History  Past Medical History:   Diagnosis Date     Depression      Hypertension      Suicide attempt (H)         Past Surgical History  No past surgical history on file.     Meds  Current Outpatient Medications   Medication Sig Dispense Refill     cholecalciferol, vitamin D3, 5,000 unit capsule TAKE 1 CAPSULE (5,000 UNITS TOTAL) BY MOUTH DAILY. *OTC NOT COVERED** 90 capsule 3     DULoxetine (CYMBALTA) 30 MG capsule TAKE 2 CAPSULES BY MOUTH EVERY DAY 60 capsule 11     DULoxetine (CYMBALTA) 60 MG capsule Take 1 capsule (60 mg total) by mouth daily. 90 capsule 3     levonorgestrel (MATTIE) 14 mcg/24 hour (3 years) IUD by Intrauterine route.       losartan (COZAAR) 50 MG tablet Take 1 tablet (50 mg total) by mouth daily. 90 tablet 3     ondansetron (ZOFRAN) 4 MG tablet Take 1 tablet (4 mg total) by mouth every 8 (eight) hours as needed for nausea. 15 tablet 0     traZODone (DESYREL) 50 MG tablet Take 1 tablet (50 mg total) by mouth at bedtime. 90 tablet 3     No current facility-administered medications for this visit.         Allergies  Amoxicillin and Other allergy (see comments)     Social History  Social History     Socioeconomic  History     Marital status: Single     Spouse name: Not on file     Number of children: Not on file     Years of education: Not on file     Highest education level: Not on file   Occupational History     Not on file   Social Needs     Financial resource strain: Not on file     Food insecurity:     Worry: Not on file     Inability: Not on file     Transportation needs:     Medical: Not on file     Non-medical: Not on file   Tobacco Use     Smoking status: Former Smoker     Types: Cigarettes     Last attempt to quit: 5/26/2016     Years since quitting: 3.0     Smokeless tobacco: Never Used   Substance and Sexual Activity     Alcohol use: No     Drug use: No     Sexual activity: Yes     Partners: Male     Birth control/protection: IUD   Lifestyle     Physical activity:     Days per week: Not on file     Minutes per session: Not on file     Stress: Not on file   Relationships     Social connections:     Talks on phone: Not on file     Gets together: Not on file     Attends Taoist service: Not on file     Active member of club or organization: Not on file     Attends meetings of clubs or organizations: Not on file     Relationship status: Not on file     Intimate partner violence:     Fear of current or ex partner: Not on file     Emotionally abused: Not on file     Physically abused: Not on file     Forced sexual activity: Not on file   Other Topics Concern     Not on file   Social History Narrative     Not on file        Family History  Family History   Problem Relation Age of Onset     Sleep apnea Mother      Sleep apnea Sister         Review of Systems:  Constitutional: Negative except as noted in HPI.   Eyes: Negative except as noted in HPI.   ENT: Negative except as noted in HPI.   Cardiovascular: Negative except as noted in HPI.   Respiratory: Negative except as noted in HPI.   Gastrointestinal: Negative except as noted in HPI.   Genitourinary: Negative except as noted in HPI.   Musculoskeletal: Negative except  "as noted in HPI.   Integumentary: Negative except as noted in HPI.   Neurological: Negative except as noted in HPI.   Psychiatric: Negative except as noted in HPI.   Endocrine: Negative except as noted in HPI.   Hematologic/Lymphatic: Negative except as noted in HPI.      STOP BANG 5/28/2019   Do you snore loudly (louder than talking or loud enough to be heard through closed doors)? 1   Do you often feel tired, fatigued, or sleepy during daytime? 1   Has anyone observed you stop breathing in your sleep? 1   Do you have or are you being treated for high blood pressure? 1   BMI more than 35 kg/m2 1   Age over 50 years old? 0   Neck circumference greater than 16 inches? 1   Gender male? 0   Total Score 6     Epworths Sleepiness Scale 5/28/2019   Sitting and reading 1   Watching TV 1   Sitting, inactive in a public place (e.g. a theatre or a meeting) 0   As a passenger in a car for an hour without a break 1   Lying down to rest in the afternoon when circumstances permit 3   Sitting and talking to someone 0   Sitting quietly after a lunch without alcohol 1   In a car, while stopped for a few minutes in traffic 0   Total score 7     Rooming 5/28/2019   Usual bedtime 12am   Sleep Latency 30 minutes   Awakenings 2-3 times   Wake Up Time 7am   Weekends varies   Difficulty falling asleep No   Difficulty staying asleep No   Excessive daytime tiredness Yes   Excessive daytime sleepiness Yes   Dozing off while driving Yes   Shift Worker No   Sleep Walking? No   Sleep Talking? Yes   Kicking or punching? Yes   Restless legs symptoms No       Physical Exam:  /85   Pulse 81   Ht 5' 6.5\" (1.689 m)   Wt (!) 295 lb (133.8 kg)   SpO2 97%   BMI 46.90 kg/m    BMI:Body mass index is 46.9 kg/m .   GEN: NAD, morbidly obese  Head: Normocephalic.  EYES: PERRLA, EOMI  ENT: Oropharynx is clear, Castro class 4+ airway.   Nasal mucosa is moist without erythema  Neck : Thyroid is within normal limits. Neck circ 16.75\"  CV: Regular rate " and rhythm, S1 & S2 positive.  LUNGS: Bilateral breathsounds heard.   ABDOMEN: Positive bowel sounds in all quadrants, soft, no rebound or guarding  MUSCULOSKELETAL: Bilateral trace leg swelling  SKIN: warm, dry, no rashes  Neurological: Alert, oriented to time, place, and person.  Psych: normal mood, normal affect     Labs/Studies:     Lab Results   Component Value Date    WBC 8.9 06/11/2018    HGB 13.6 06/11/2018    HCT 40.3 06/11/2018    MCV 87 06/11/2018     06/11/2018         Chemistry        Component Value Date/Time     06/11/2018 2014    K 4.7 06/11/2018 2014     06/11/2018 2014    CO2 26 06/11/2018 2014    BUN 12 06/11/2018 2014    CREATININE 0.78 06/11/2018 2014    GLU 88 06/11/2018 2014        Component Value Date/Time    CALCIUM 9.7 06/11/2018 2014    ALKPHOS 93 06/11/2018 2014    AST 20 06/11/2018 2014    ALT 31 06/11/2018 2014    BILITOT 0.5 06/11/2018 2014            No results found for: FERRITIN  Lab Results   Component Value Date    TSH 1.47 06/11/2018         Assessment and Plan:  In summary Liz June is a 23 y.o. year old female here for sleep disturbance.  1.  Suspected sleep apnea   Ms. Liz June has high risk for obstructive sleep apnea based on the history of witnessed apnea, snoring and a crowded airway. I educated the patient on the underlying pathophysiology of obstructive sleep apnea. We reviewed the risks associated with sleep apnea, including increased cardiovascular risk and overall death. We talked about treatments briefly. I recommend getting an split-night nocturnal polysomnography. The patient should return to the clinic to discuss results and treatment option in a patient-centered approach.  2.  Hypersomnia  3.  Snoring  4.  Morbid obesity    Patient verbalized understanding of these issues, agrees with the plan and all questions were answered today. Patient was given an opportuntity to voice any other symptoms or concerns not listed above.  Patient did not have any other symptoms or concerns.      Patient told to return in one week after the sleep study is interpreted.      Magnus Marshall DO  Board Certified in Internal Medicine and Sleep Medicine  [facility].    (Note created with Dragon voice recognition and unintended spelling errors and word substitutions may occur)

## 2021-05-30 VITALS — BODY MASS INDEX: 45.99 KG/M2 | HEIGHT: 67 IN | WEIGHT: 293 LBS

## 2021-05-30 NOTE — TELEPHONE ENCOUNTER
Yes. For cases like these, please just send letter after 3rd phone call attempt and close the encounter. No need to task me. Thank you.

## 2021-05-30 NOTE — TELEPHONE ENCOUNTER
3rd attempt: LMTRC for results.       Dr. Marshall-    Please advise. Would you like a letter sent?       Sarah RAMESH CMA, SLEEP MEDICINE, 7/15/2019 10:35 AM

## 2021-05-30 NOTE — TELEPHONE ENCOUNTER
2nd attempt: 7/9/2019 at [phone]. [facility] for results.      Sarah RAMESH CMA, SLEEP MEDICINE, 7/9/2019 10:53 AM

## 2021-05-30 NOTE — TELEPHONE ENCOUNTER
Called the patient at [phone], no answer, left a brief message for the patient to call back.    Dolores Hagan CMA 7/8/2019 1:08 PM

## 2021-05-31 VITALS — BODY MASS INDEX: 45.99 KG/M2 | WEIGHT: 293 LBS | HEIGHT: 67 IN

## 2021-05-31 VITALS — WEIGHT: 293 LBS | BODY MASS INDEX: 47.46 KG/M2

## 2021-06-01 VITALS — BODY MASS INDEX: 45.99 KG/M2 | WEIGHT: 293 LBS | HEIGHT: 67 IN

## 2021-06-02 VITALS — BODY MASS INDEX: 45.99 KG/M2 | WEIGHT: 293 LBS | HEIGHT: 67 IN

## 2021-06-02 VITALS — HEIGHT: 67 IN | WEIGHT: 293 LBS | BODY MASS INDEX: 45.99 KG/M2

## 2021-06-02 VITALS — WEIGHT: 293 LBS | BODY MASS INDEX: 47.83 KG/M2

## 2021-06-02 VITALS — BODY MASS INDEX: 45.99 KG/M2 | HEIGHT: 67 IN | WEIGHT: 293 LBS

## 2021-06-03 VITALS — HEIGHT: 66 IN | WEIGHT: 293 LBS | BODY MASS INDEX: 48.34 KG/M2 | BODY MASS INDEX: 48.34 KG/M2

## 2021-06-04 VITALS
HEIGHT: 66 IN | BODY MASS INDEX: 44.97 KG/M2 | WEIGHT: 279.8 LBS | RESPIRATION RATE: 12 BRPM | SYSTOLIC BLOOD PRESSURE: 110 MMHG | OXYGEN SATURATION: 98 % | HEART RATE: 86 BPM | DIASTOLIC BLOOD PRESSURE: 84 MMHG | TEMPERATURE: 98.2 F

## 2021-06-05 ENCOUNTER — COMMUNICATION - HEALTHEAST (OUTPATIENT)
Dept: FAMILY MEDICINE | Facility: CLINIC | Age: 26
End: 2021-06-05

## 2021-06-05 DIAGNOSIS — I10 ESSENTIAL HYPERTENSION: ICD-10-CM

## 2021-06-05 NOTE — PROGRESS NOTES
"SUBJECTIVE  Liz June is a 24 y.o. female here for:    Chief Complaint   Patient presents with     Sore Throat     for 2 weeks      Hx of recurrent sore throats since she was teenager  One sore throat every 2-3 months, lasts at least a few days, severe symptoms  She had viral illness recently- now with sore throat 2 weeks duration  When she gets sore throat- she usually always has negative strep swabs  She also gets tonsil stones- uses q tips to get these out  Tried waterpik previously but it was too painful  Denies headache, fever, cough  She denies chronic cough  Denies epigastric burning, reflux symptoms    ROS  Complete 10 point review of systems negative except as noted above in HPI    Reviewed Past Medical History, Medications, Family History and Social History in Epic and up to date with no new changes.    OBJECTIVE  /84   Pulse 86   Temp 98.2  F (36.8  C) (Oral)   Resp 12   Ht 5' 6.5\" (1.689 m)   Wt (!) 279 lb 12.8 oz (126.9 kg)   SpO2 98%   BMI 44.49 kg/m       General: Cooperative, pleasant, in no acute distress  HEENT: NCAT, sclera clear, MMM, tonsils 2+ with tonsil stones visualized in deep tonsilar crypts.  Neck: no lymphadenopathy, no masses  CV: RRR, normal S1/S2, no murmur, rubs, gallops  Resp: No respiratory distress. Clear to auscultation bilaterally. No wheezes, rales, rhonchi      ASSESSMENT/PLAN:   Liz was seen today for sore throat.    Diagnoses and all orders for this visit:      Recurrent tonsillitis/Tonsilliths: Chronic recurrent pharyngitis/tonsillitis with negative strep tests. 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 Patient has tried supportive treatments and continues to have recurrence of symptoms at least every 2 months. Discussed options for treatment, will refer to ENT for further evaluation and possible tonsillectomy.  -     Rapid Strep A Screen- Throat Swab  -     Group A Strep, RNA Direct Detection, Throat  -     " Ambulatory referral to ENT      Follow-up in 6 months for preventive physical.      Options for treatment and follow-up care were reviewed with the patient. Liz June and/or guardian was engaged and actively involved in the decision making process. Liz June and/or guardian verbalized understanding of the options discussed and was satisfied with the final plan.      Kaylee Nugent MD

## 2021-06-06 NOTE — TELEPHONE ENCOUNTER
Please direct patient to on-care for recommendations regarding testing and further evaluation.    Kaylee Nugent

## 2021-06-07 NOTE — TELEPHONE ENCOUNTER
RN cannot approve Refill Request    RN can NOT refill this medication PCP messaged that patient is overdue for Labs. Last office visit: 1/29/2020 Kaylee Nugent MD Last Physical: Visit date not found Last MTM visit: Visit date not found Last visit same specialty: 1/29/2020 Kaylee Nugent MD.  Next visit within 3 mo: Visit date not found  Next physical within 3 mo: Visit date not found      Titi Jean, [facility] [place] Connection Triage/Med Refill 4/12/2020    Requested Prescriptions   Pending Prescriptions Disp Refills     losartan (COZAAR) 50 MG tablet [Pharmacy Med Name: LOSARTAN POTASSIUM 50 MG TAB] 30 tablet 2     Sig: TAKE 1 TABLET BY MOUTH EVERY DAY       Angiotensin Receptor Blocker Protocol Failed - 4/12/2020  9:41 AM        Failed - Serum potassium within the past 12 months     No results found for: LN-POTASSIUM          Failed - Serum creatinine within the past 12 months     Creatinine   Date Value Ref Range Status   06/11/2018 0.78 0.60 - 1.10 mg/dL Final             Passed - PCP or prescribing provider visit in past 12 months       Last office visit with prescriber/PCP: 1/29/2020 Kaylee Nugent MD OR same dept: 1/29/2020 Kaylee Nugent MD OR same specialty: 1/29/2020 Kaylee Nugent MD  Last physical: Visit date not found Last MTM visit: Visit date not found   Next visit within 3 mo: Visit date not found  Next physical within 3 mo: Visit date not found  Prescriber OR PCP: Kaylee Nugent MD  Last diagnosis associated with med order: 1. Depression, unspecified depression type  - DULoxetine (CYMBALTA) 60 MG capsule; TAKE 1 CAPSULE BY MOUTH EVERY DAY  Dispense: 90 capsule; Refill: 2    2. Hypertension  - losartan (COZAAR) 50 MG tablet [Pharmacy Med Name: LOSARTAN POTASSIUM 50 MG TAB]; TAKE 1 TABLET BY MOUTH EVERY DAY  Dispense: 30 tablet; Refill: 2    If protocol passes may refill for 12 months if within 3 months of last provider visit (or a total of 15 months).             Passed - Blood pressure filed in  past 12 months     BP Readings from Last 1 Encounters:   01/29/20 110/84              Signed Prescriptions Disp Refills    DULoxetine (CYMBALTA) 60 MG capsule 90 capsule 2     Sig: TAKE 1 CAPSULE BY MOUTH EVERY DAY       Tricyclics/Misc Antidepressant/Antianxiety Meds Refill Protocol Passed - 4/12/2020  9:41 AM        Passed - PCP or prescribing provider visit in last year     Last office visit with prescriber/PCP: 1/29/2020 Kaylee Nugent MD OR same dept: 1/29/2020 Kaylee Nugent MD OR same specialty: 1/29/2020 Kaylee Nugent MD  Last physical: Visit date not found Last MTM visit: Visit date not found   Next visit within 3 mo: Visit date not found  Next physical within 3 mo: Visit date not found  Prescriber OR PCP: Kaylee Nugent MD  Last diagnosis associated with med order: 1. Depression, unspecified depression type  - DULoxetine (CYMBALTA) 60 MG capsule; TAKE 1 CAPSULE BY MOUTH EVERY DAY  Dispense: 90 capsule; Refill: 2    2. Hypertension  - losartan (COZAAR) 50 MG tablet [Pharmacy Med Name: LOSARTAN POTASSIUM 50 MG TAB]; TAKE 1 TABLET BY MOUTH EVERY DAY  Dispense: 30 tablet; Refill: 2    If protocol passes may refill for 12 months if within 3 months of last provider visit (or a total of 15 months).

## 2021-06-07 NOTE — TELEPHONE ENCOUNTER
Refill Approved    Rx renewed per Medication Renewal Policy. Medication was last renewed on 12/16/2019       Titi Jean, [facility] [place] Connection Triage/Med Refill 4/12/2020     Requested Prescriptions   Pending Prescriptions Disp Refills     DULoxetine (CYMBALTA) 60 MG capsule [Pharmacy Med Name: DULOXETINE HCL DR 60 MG CAP] 30 capsule 2     Sig: TAKE 1 CAPSULE BY MOUTH EVERY DAY       Tricyclics/Misc Antidepressant/Antianxiety Meds Refill Protocol Passed - 4/12/2020  9:41 AM        Passed - PCP or prescribing provider visit in last year     Last office visit with prescriber/PCP: 1/29/2020 Kaylee Nugent MD OR same dept: 1/29/2020 Kaylee Nugent MD OR same specialty: 1/29/2020 Kaylee Nugent MD  Last physical: Visit date not found Last MTM visit: Visit date not found   Next visit within 3 mo: Visit date not found  Next physical within 3 mo: Visit date not found  Prescriber OR PCP: Kaylee Nugent MD  Last diagnosis associated with med order: 1. Depression, unspecified depression type  - DULoxetine (CYMBALTA) 60 MG capsule [Pharmacy Med Name: DULOXETINE HCL DR 60 MG CAP]; TAKE 1 CAPSULE BY MOUTH EVERY DAY  Dispense: 30 capsule; Refill: 2    2. Hypertension  - losartan (COZAAR) 50 MG tablet [Pharmacy Med Name: LOSARTAN POTASSIUM 50 MG TAB]; TAKE 1 TABLET BY MOUTH EVERY DAY  Dispense: 30 tablet; Refill: 2    If protocol passes may refill for 12 months if within 3 months of last provider visit (or a total of 15 months).                losartan (COZAAR) 50 MG tablet [Pharmacy Med Name: LOSARTAN POTASSIUM 50 MG TAB] 30 tablet 2     Sig: TAKE 1 TABLET BY MOUTH EVERY DAY       Angiotensin Receptor Blocker Protocol Failed - 4/12/2020  9:41 AM        Failed - Serum potassium within the past 12 months     No results found for: LN-POTASSIUM          Failed - Serum creatinine within the past 12 months     Creatinine   Date Value Ref Range Status   06/11/2018 0.78 0.60 - 1.10 mg/dL Final             Passed - PCP or prescribing  provider visit in past 12 months       Last office visit with prescriber/PCP: 1/29/2020 Kaylee Nugent MD OR same dept: 1/29/2020 Kaylee Nugent MD OR same specialty: 1/29/2020 Kaylee Nugent MD  Last physical: Visit date not found Last MTM visit: Visit date not found   Next visit within 3 mo: Visit date not found  Next physical within 3 mo: Visit date not found  Prescriber OR PCP: Kaylee Nugent MD  Last diagnosis associated with med order: 1. Depression, unspecified depression type  - DULoxetine (CYMBALTA) 60 MG capsule [Pharmacy Med Name: DULOXETINE HCL DR 60 MG CAP]; TAKE 1 CAPSULE BY MOUTH EVERY DAY  Dispense: 30 capsule; Refill: 2    2. Hypertension  - losartan (COZAAR) 50 MG tablet [Pharmacy Med Name: LOSARTAN POTASSIUM 50 MG TAB]; TAKE 1 TABLET BY MOUTH EVERY DAY  Dispense: 30 tablet; Refill: 2    If protocol passes may refill for 12 months if within 3 months of last provider visit (or a total of 15 months).             Passed - Blood pressure filed in past 12 months     BP Readings from Last 1 Encounters:   01/29/20 110/84

## 2021-06-07 NOTE — TELEPHONE ENCOUNTER
Pt has hx of frequent sore throats with negative strep screens per OV notes from 1/29/20.  Can PCP accommodate pt request?  Thanks.

## 2021-06-07 NOTE — PROGRESS NOTES
"Liz June is a 24 y.o. female who is being evaluated via a billable telephone visit.      The patient has been notified of following:     \"This telephone visit will be conducted via a call between you and your physician/provider. We have found that certain health care needs can be provided without the need for a physical exam.  This service lets us provide the care you need with a short phone conversation.  If a prescription is necessary we can send it directly to your pharmacy.  If lab work is needed we can place an order for that and you can then stop by our lab to have the test done at a later time.    Telephone visits are billed at different rates depending on your insurance coverage. During this emergency period, for some insurers they may be billed the same as an in-person visit.  Please reach out to your insurance provider with any questions.    If during the course of the call the physician/provider feels a telephone visit is not appropriate, you will not be charged for this service.\"    Patient has given verbal consent to a Telephone visit? Yes    Patient would like to receive their AVS by AVS Preference: Archie.    Additional provider notes:    History of anxiety, depression  On duloxetine 60 mg daily  Has been staying at home  \"Ruminating on a lot of things\"  \"Body can't calm herself down\"  Dad- thought he was going to die- history of brain tumor. Anticipation of waiting for his death has been very difficult.  Lives with partner and room-mate. Reports great support. Also has dogs.   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 She is not currently in therapy.  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  Would like to try increasing duloxetine to 90 mg daily  She has previously tried prozac (this just stopped working after years), effexor, buproprion.  IUD in place  Reports " passive SI but denies plan or intent- she feels safe and has safety plan.   RALF 7: 19  PHQ-9: 21    Assessment/Plan:  Liz was seen today for anxiety.    Diagnoses and all orders for this visit:    Anxiety: Worsening symptoms, likely 2/2 her father's acute illness and quarantining. 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 She was previously doing well on duloxetine 60 mg daily (has tried many other medications) and she prefers to increase this instead of switching to another medication. Will increase to 90 mg daily. Encouraged regular activity, exercise, meditation, and encouraged her to reach out to her insurance to see if she has better coverage for therapy (her previous therapist- she does not have enough $ to go back to) and to consider virtual therapy as well. She does endorse passive SI but denies plan or intent- she is aware of all emergency resources and lives with significant other and room-mate and feels supported. Reviewed safety plan.  -     DULoxetine (CYMBALTA) 30 MG capsule; Take 3 capsules (90 mg total) by mouth daily.    Hypertension: Refilled to new pharmacy. She will need annual physical soon for labs- she will schedule this in July/August once Covid-19 restrictions ease.  -     losartan (COZAAR) 50 MG tablet; Take 1 tablet (50 mg total) by mouth daily.    Vitamin D deficiency: Refilled med to new pharmacy  -     cholecalciferol, vitamin D3, 125 mcg (5,000 unit) capsule; TAKE 1 CAPSULE BY MOUTH DAILY. *OTC NOT COVERED**    Medication refill: Refilled med to new pharmacy  -     albuterol (PROAIR HFA;PROVENTIL HFA;VENTOLIN HFA) 90 mcg/actuation inhaler; Inhale 2 puffs every 6 (six) hours as needed for wheezing.      Phone call duration:  12 minutes    Kaylee Nugent MD

## 2021-06-07 NOTE — TELEPHONE ENCOUNTER
RN cannot approve Refill Request    RN can NOT refill this medication med is not covered by policy/route to provider     . Last office visit: 1/29/2020 Kaylee Nugent MD Last Physical: Visit date not found Last MTM visit: Visit date not found Last visit same specialty: 1/29/2020 Kaylee Nugent MD.  Next visit within 3 mo: Visit date not found  Next physical within 3 mo: Visit date not found      O[facility] Hills, [facility] Triage/Med Refill 3/20/2020    Requested Prescriptions   Pending Prescriptions Disp Refills     cholecalciferol, vitamin D3, 125 mcg (5,000 unit) capsule [Pharmacy Med Name: [facility] VITAMIN D3 5,000 UNIT SFGL] 90 capsule 3     Sig: TAKE 1 CAPSULE BY MOUTH DAILY. *OTC NOT COVERED**       There is no refill protocol information for this order
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
## 2021-06-10 NOTE — TELEPHONE ENCOUNTER
08/24/2020 12:47 Alison Gray Left Erik     [facility] left msg for pt to call them.
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
## 2021-06-11 NOTE — TELEPHONE ENCOUNTER
Called and clarified that she should be taking 90 mg daily- insurance does not cover- prior auth was started. Awaiting to hear back. If 90 mg is not covered, then okay to prescribe 60 mg daily- I reviewed this with pharmacy staff.    Kaylee Nugent MD

## 2021-06-11 NOTE — TELEPHONE ENCOUNTER
Prior Authorization Request  Who s requesting:  Pharmacy  Pharmacy Name and Location: [facility]  Medication Name: DULoxetine (CYMBALTA) 30 MG capsule   Insurance Plan: [facility] Medicaid  Insurance Member ID Number:    Bin [id number]   [facility]  ID [id number]    CoverMyMeds Key: N/A  Informed patient that prior authorizations can take up to 10 business days for response:   No  Okay to leave a detailed message: No

## 2021-06-11 NOTE — TELEPHONE ENCOUNTER
Medication Question or Clarification  Who is calling: Derrick Tony Pharmacist  What medication are you calling about (include dose and sig)?:   Disp  Refills  Start  End      DULoxetine (CYMBALTA) 30 MG capsule  270 capsule  3  4/29/2020      Sig - Route: Take 3 capsules (90 mg total) by mouth daily. - Oral     Sent to pharmacy as: DULoxetine 30 mg capsule,delayed release (Cymbalta)     E-Prescribing Status: Receipt confirmed by pharmacy (4/29/2020 11:15 AM CDT)         Who prescribed the medication?: Kaylee Nugent MD   What is your question/concern?: Caller stated they transferred this Rx from [facility] for the patient. Caller stated they need to confirm the dose for the patient's Cymbalta because the patient picked up an Rx for Cymbalta 60 mg - 1 cap a day. Kaylee Nugent MD's note from 4/29/20 stated the patient is on 90 mg of Cymbalta. Please clarify this and let the patient and the pharmacy know.  Requested Pharmacy: [facility]  Okay to leave a detailed message?: No

## 2021-06-11 NOTE — TELEPHONE ENCOUNTER
Central PA team  [phone]  Pool: HE PA MED ([id number])          PA has been initiated.       PA form completed and faxed insurance via Cover My Meds     Key:  [id number]     Medication:  DULOXETINE 30MG    Insurance:  BLUE [employer]US        Response will be received via fax and may take up to 5-10 business days depending on plan

## 2021-06-12 NOTE — TELEPHONE ENCOUNTER
Refill Approved    Rx renewed per Medication Renewal Policy. Medication was last renewed on 4/292/20.    Ofe Hills, [facility] Triage/Med Refill 11/5/2020     Requested Prescriptions   Pending Prescriptions Disp Refills     albuterol (PROAIR HFA;PROVENTIL HFA;VENTOLIN HFA) 90 mcg/actuation inhaler 1 Inhaler 3     Sig: Inhale 2 puffs every 6 (six) hours as needed for wheezing.       Albuterol/Levalbuterol Refill Protocol Passed - 11/3/2020  7:08 PM        Passed - PCP or prescribing provider visit in last year     Last office visit with prescriber/PCP: 1/29/2020 Kaylee Nugent MD OR same dept: Visit date not found OR same specialty: Visit date not found Last physical: Visit date not found       Next appt within 3 mo: Visit date not found  Next physical within 3 mo: Visit date not found  Prescriber OR PCP: Kaylee Nugent MD  Last diagnosis associated with med order: 1. Cough  - albuterol (PROAIR HFA;PROVENTIL HFA;VENTOLIN HFA) 90 mcg/actuation inhaler; Inhale 2 puffs every 6 (six) hours as needed for wheezing.  Dispense: 1 Inhaler; Refill: 3    If protocol passes may refill for 6 months if within 3 months of last provider visit (or a total of 9 months). If patient requesting >1 inhaler per month refill x 6 months and have patient make appointment with provider.

## 2021-06-12 NOTE — PROGRESS NOTES
"Liz June is a 25 y.o. female who is being evaluated via a billable video visit.      The patient has been notified of following:     \"This video visit will be conducted via a call between you and your physician/provider. We have found that certain health care needs can be provided without the need for an in-person physical exam.  This service lets us provide the care you need with a video conversation.  If a prescription is necessary we can send it directly to your pharmacy.  If lab work is needed we can place an order for that and you can then stop by our lab to have the test done at a later time.    Video visits are billed at different rates depending on your insurance coverage. Please reach out to your insurance provider with any questions.    If during the course of the call the physician/provider feels a video visit is not appropriate, you will not be charged for this service.\"    Patient has given verbal consent to a Video visit? Yes  How would you like to obtain your AVS? AVS Preference: MyChart.  Will anyone else be joining your video visit? Yes, partner was in room with patient.        Video Start Time: 3:21pm    Additional provider notes:     For the past month, been struggling with mood and suicidal thoughts  She typically always has difficulty around this time of year   She has been using all of her coping skills- she has great friend support and supportive partner  She recently got a new kitten- has a dog as well  \"State of the world with covid\" has been a trigger recently  She was working at [employer]- loved her job but recently she has felt irritable and sad, like she wants to \"go into the closet and cry\"  She told her boss/employer today that she thinks she needs to resign- called in sick- her boss was supportive and said that she can take some time off  She has been stable on duloxetine 90 mg daily  Previously she has used effexor (stopped due to concerns about BP), buproprion, prozac " "(stopped working eventually) and venlafaxine (did not work)  She feels that she becomes tolerant to the medications over time and they stop working after awhile  She is not currently in therapist- she is a therapist herself by background and feels that she does not need therapy at this time. She is doing all of her coping skills.  She has had increasing thoughts of dying over the last month- everyday frequency now. She thinks about \"crashing my car\". She knows that she will not get in a car when she has these thoughts. She feels safe. She previously has been inpatient in 2018 and does not feel she needs this. Denies any intent to act on her thoughts. She is aware of emergency, crisis numbers and reaches out to friends when things get worse.  She knows how to access emergency mental health resources/ED if she feels unsafe  She would like to discuss changing her medications    Gen: well appearing, no distress  Psych: intermittently tearful, normal speech and thought content, good insight and judgment, +SI but denies intent (thinks about crashing car but she would never get in a car when she has these thoughts)    Liz was seen today for depression.    Diagnoses and all orders for this visit:    Severe episode of recurrent major depressive disorder, without psychotic features (H)/Anxiety: Reviewed her increasing suicidal thoughts today in detail- she has no intention of acting on these thoughts and is aware of mental health crisis resources and partial hospitalization/IOP vs hospitalization and feels that she is able to access these resources if she needs to- she is currently at home with her partner, who is supportive and she has a network of friends that she uses as support. 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 Discussed safety plan in detail- she is not actively suicidal and does not need hospitalization at " this time. Reviewed medication options- agreed to try a different medication. She will need to titrate down slowly on her duloxetine to avoid withdrawal- from 90 to 60 to 30 mg daily. She will titrate up on escitalopram to 10 mg daily- could increase to 20 mg daily in the future if needed. Scheduled close follow-up in 2 weeks- she will notify me via [employer] sooner if she has concerns- I also sent resources through her patient instructions in [employer]. She will check with employer- if any paperwork is needed for her to take a medical leave- she will let me know.  -     escitalopram oxalate (LEXAPRO) 10 MG tablet; Take 1 tablet (10 mg total) by mouth daily.  -     DULoxetine (CYMBALTA) 30 MG capsule; Take 1 capsule (30 mg total) by mouth daily.      Video-Visit Details    Type of service:  Video Visit    Video End Time (time video stopped): 3:45PM  Originating Location (pt. Location): Home    Distant Location (provider location):  [facility]     Platform used for Video Visit: Ayesha Nugent MD

## 2021-06-12 NOTE — TELEPHONE ENCOUNTER
Attempted to call patient. No answer. Please call patient and ask her what she would prefer- okay to add her as virtual visit to any of my openings or next week on my virtual day 10/22 to discuss medication changes. If she feels unsafe, she should call 911 and go to hospital.     Kaylee Nugent  
Home

## 2021-06-12 NOTE — PROGRESS NOTES
"Liz June is a 25 y.o. female who is being evaluated via a billable video visit.      The patient has been notified of following:     \"This video visit will be conducted via a call between you and your physician/provider. We have found that certain health care needs can be provided without the need for an in-person physical exam.  This service lets us provide the care you need with a video conversation.  If a prescription is necessary we can send it directly to your pharmacy.  If lab work is needed we can place an order for that and you can then stop by our lab to have the test done at a later time.    Video visits are billed at different rates depending on your insurance coverage. Please reach out to your insurance provider with any questions.    If during the course of the call the physician/provider feels a video visit is not appropriate, you will not be charged for this service.\"    Patient has given verbal consent to a Video visit? Yes  How would you like to obtain your AVS? AVS Preference: [employer].  Will anyone else be joining your video visit? Yes- partner is in background during visit per patient preference        Video Start Time: 12:55 PM    Additional provider notes:     Depression/Anxiety  Has decreased duloxetine from 90 mg down to 30 mg daily  Duloxetine- 30 mg daily- started yesterday  Lexapro 10 mg- tolerating well  She did well decreasing the duloxetine  Would like to go off duloxetine eventually  \"Super depressed\" still  Partner feels like she is a little better  Easier getting out of bed in the morning  Current climate- with election- exacerbating symptoms  She is still off work- thinking about going back in 2-3 weeks  Misses coworkers and kids    Hidraadenitis suppurativa-  Had cystic lesions intermittently since around age 13  Sister has hidraadenitis as well and sees a dermatologist  She has never seen dermatologist for this previously  Mainly on thigh area/groin area, sometimes in " lower stomach, axillary  Has tried different washes- antiseptic wash from previous PCP  Uses penoxal (dried some of the areas out)  Has been on accutane for acne when she was younger- had 1 round  She has never been on oral antibiotics previously      Excessive sweating-  Since she was younger  Mom has this as well  Has not tried anything except OTC antiperspirants  On head and axillary region    Physical exam:  Gen: well appearing    Liz was seen today for follow-up.    Diagnoses and all orders for this visit:    Anxiety/depression: Exacerbated by recent political environment/election- she has good family and partner support. Partner feels her symptoms have improved slightly since changing medicatino- she titrated down on duloxetine from 90 to 30 mg daily and started lexapro 10 mg daily- for about 2 weeks. Plan to use hydroxyzine as needed for panic symptoms in addition to nonpharmacologic treatment. She will continue on celexa 10 mg for another 2-4 weeks and then will consider increasing to 20 mg daily- she will my chart to let me know how she is doing. She will also titrate off duloxetine.  -     hydrOXYzine HCL (ATARAX) 25 MG tablet; Take 1 tablet (25 mg total) by mouth daily as needed for anxiety.    Generalized hyperhidrosis: Discussed treatment options- will try drysol and will also refer to dermatology for further evaluation and treatment options, including possible botox.  -     aluminum chloride (DRYSOL) 20 % external solution; Apply topically 2 (two) times a day.    Hidradenitis suppurativa: Severe- discussed treatment options including antibiotics, topical treatments, accutane- she also has questions about humira (biologics)- due to severity- will refer to dermatology- she will check with insurance and let me know where she would like referral sent.      Video-Visit Details    Type of service:  Video Visit    Video End Time (time video stopped): 1:18PM  Originating Location (pt. Location):  Home    Distant Location (provider location):  [facility]     Platform used for Video Visit: Ld Nugent MD

## 2021-06-13 NOTE — PROGRESS NOTES
Subjective:   Liz comes in with a 10 day history of a rash.  This started on the right breast.  It was quite pruritic.  She then started developing small lesions over the chest, abdomen and back.  She has been to urgent care twice.  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  She did have some nasal congestion and fever 2 weeks ago.  She complains of a lot of itching.  She has had no drainage from any of the lesions.  Lesions do not seem to be growing in size but they are getting more in number.  She has tried Benadryl for itching but it does not seem to control the rash and itching very well.  Itching is very severe.  She is unable to sleep because of this.      Objective:  Skin: There are erythematous papules which are oval in shape over the chest, back and abdomen.  Most of these measure 1 cm in diameter or less.  There is a larger 3 cm patch over the right breast.  They are raised.  There is mild desquamation present.  They are slightly pink in color.  They shane.  No exudative process noted.  HEENT: Pharynx is clear.  Neck: No lymphadenopathy present.  Lungs: Lungs are clear.  Patient's in no respiratory distress.      Assessment:  1.  Pityriasis rosea with diagnostic herald patch      Plan:  The patient has tried Benadryl.  This has not helped her itching at all.  We will try Famvir 500 mg 3 times daily for a week to see if this helps resolve the itching.  She can continue a once a day antihistamine so that she can continue school.  She can add Benadryl at nighttime.  She can try calamine lotion.  She was instructed in the typical course for pityriasis.  She will follow-up here as needed.

## 2021-06-14 NOTE — TELEPHONE ENCOUNTER
Severe episode of recurrent major depressive disorder, without psychotic features (H)/Anxiety: Reviewed her increasing suicidal thoughts today in detail- she has no intention of acting on these thoughts and is aware of mental health crisis resources and partial hospitalization/IOP vs hospitalization and feels that she is able to access these resources if she needs to- she is currently at home with her partner, who is supportive and she has a network of friends that she uses as support. 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 Discussed safety plan in detail- she is not actively suicidal and does not need hospitalization at this time. Reviewed medication options- agreed to try a different medication. She will need to titrate down slowly on her duloxetine to avoid withdrawal- from 90 to 60 to 30 mg daily. She will titrate up on escitalopram to 10 mg daily- could increase to 20 mg daily in the future if needed. Scheduled close follow-up in 2 weeks- she will notify me via [employer] sooner if she has concerns- I also sent resources through her patient instructions in [employer]. She will check with employer- if any paperwork is needed for her to take a medical leave- she will let me know.  -     escitalopram oxalate (LEXAPRO) 10 MG tablet; Take 1 tablet (10 mg total) by mouth daily.  -     DULoxetine (CYMBALTA) 30 MG capsule; Take 1 capsule (30 mg total) by mouth daily.

## 2021-06-14 NOTE — TELEPHONE ENCOUNTER
Refill Request  Did you contact pharmacy: Yes  Medication name:   Requested Prescriptions     Pending Prescriptions Disp Refills     escitalopram oxalate (LEXAPRO) 10 MG tablet       Sig: Take 1 tablet (10 mg total) by mouth daily.     Who prescribed the medication: Dr. Nugent  [facility]ed [facility]acy[facility]  Is patient out of medication: Unknown  Patient notified refills processed in 3 business days:  no  Okay to leave a detailed message: no

## 2021-06-14 NOTE — PROGRESS NOTES
"Liz June is a 25 y.o. adult who is being evaluated via a billable telephone visit.      What phone number would you like to be contacted at? See chart  How would you like to obtain your AVS? AVS Preference: [employer].     Diagnoses and all orders for this visit:    2019 novel coronavirus disease (COVID-19): Covid positive on 12/29 with persistent symptoms of low grade fever, cough, shortness of breath, wheezing. Her mother does have pulse-oximeter- and she will get this and start to monitor her oxygen levels- reviewed warning signs and when to go to ED. Encouraged using albuterol more regularly, every 4-6 hours to help with her symptoms and wheezing. Offered tessalon perles for cough but she wants to try beta agonist inhaler first. Encouraged pushing fluids, using tylenol or ibuprofen. She works in - I will write letter for her and send via [employer].    Screening for diabetes mellitus: Will schedule lab-only visit  -     Glucose FUTURE; Future    Lipid screening: Will schedule lab-only visit  -     Lipid [place] FASTING; Future    Essential hypertension: On losartan- will schedule lab-only visit  -     Basic Metabolic Panel; Future        Subjective     Liz June is 25 y.o. and presents to clinic today for the following health issues   HPI     Covid-19 positive- on 12/29/20  Sore throat on 12/27  Sore throat has resolved  T 99.7-101F since 12/28- has persisted  100.1 this morning  Coughing  Had a wheeze \"for awhile\"- has worsened lately, especially when she lays down and his partner has noticed  Using albuterol 2-3 times per day  \"Gets winded quickly\"  Having some mild shortness of breath  Had some chest pains when she started being more active- tried to paint walls.  Felt \"heart beat loud in throat\" \"felt like chest was burning\"- 5 days ago  Symptoms have overall been persistent- have not worsened.  Poor appetite  Drinking lots of water.  Mom has pulse-ox and will drop off later.  Wondering " about when to return to work- works in       Review of Systems  See HPI      Objective       Vitals:  No vitals were obtained today due to virtual visit.    Physical Exam  Unable to assess due to telephone visit      Phone call duration: 13 minutes    Kaylee Nugent MD

## 2021-06-16 NOTE — PROGRESS NOTES
"Liz June is a 25 y.o. adult who is being evaluated via a billable video visit.      How would you like to obtain your AVS? MyChart.  If dropped from the video visit, the video invitation should be resent by: Text to cell phone: [phone]  Will anyone else be joining your video visit? No      Video Start Time: 11:40    Assessment & Plan     Perioral dermatitis  Discussed risks and benefits of the treatment with the patient and indications for follow-up.  - metroNIDAZOLE (METROCREAM) 0.75 % cream  Dispense: 45 g; Refill: 4    Major depression  After the completion of her video visit, I noted the results of her PHQ-9 screening from today. I called the patient back over the phone and we had a discussion about her depression. Patient reports it has been worse since her father has been on home hospice. She has had some passive suicidal thinking but no intention or planning. She reports \"overall, I feel like I'm managing okay.\" She continues to take her duloxetine and Lexapro. Her usual physician is out on maternity leave so I offered her follow-up with myself or one of our partners, she elects for a telephone follow-up in 3 weeks with me. Patient counseled on indications for sooner follow-up and emergent follow-up. She is in agreement with the plan. Patient was also offered referral to psychotherapy but declines that at this time.      Subjective   Liz June is 25 y.o. and presents today for the following health issues   HPI -25-year-old female had a diagnosis of perioral dermatitis several years ago that responded well to metronidazole cream. Over the past month she has had a recurrence of a mild patchy rash around the mouth. It is not itchy or painful. Otherwise, she has been feeling well. She does not have any other concerns.                  Objective       Vitals:  No vitals were obtained today due to virtual visit.    Physical Exam  Skin exam done over the video visit shows a small patch of " redness just lateral and inferior to the mouth.            Video-Visit Details    Type of service:  Video Visit    Video End Time (time video stopped): 11:47  Originating Location (pt. Location): Home    Distant Location (provider location):  [facility]     Platform used for Video Visit: Ayesha

## 2021-06-16 NOTE — TELEPHONE ENCOUNTER
Pt last seen by PCP on 1/14/21 for virtual visit.  Pt has been on Trazadone 50 mg one at HS since 2018.  There was one message on 2/10/21 where pt states she is using more to help sleep, but then stopped using it and then went back again.  Pt had not previously requested an increase.  PCP is back tomorrow.  Should defer to PCP? Thanks.

## 2021-06-16 NOTE — TELEPHONE ENCOUNTER
Pt had previously been diagnosed with     Hidradenitis suppurativa: Severe- discussed treatment options including antibiotics, topical treatments, accutane- she also has questions about humira (biologics)- due to severity- will refer to dermatology- she will check with insurance and let me know where she would like referral sent.    CMT did see in pt chart that she was given the medication she mentioned.     Again, PCP is back in clinic tomorrow.  Can covering provider address or can this wait until tomorrow?  Thanks.

## 2021-06-17 NOTE — PROGRESS NOTES
Unable to reach patient by phone for her scheduled telephone appointment.  I left her voice message that I would reschedule her for a future telephone follow-up visit.  I also included in that message that she should call me sooner or come into clinic sooner if needed.

## 2021-06-17 NOTE — PATIENT INSTRUCTIONS - HE
Patient Instructions by Magnus Marshall DO at 5/28/2019  8:20 AM     Author: Magnus Marshall DO Service: -- Author Type: Physician    Filed: 5/28/2019  8:26 AM Encounter Date: 5/28/2019 Status: Signed    : Magnus Marshall DO (Physician)       Please bring one tab of low dose melatonin 3 mg or less to the night of the study.    If the patient wishes to take the melatonin. It is completely voluntary.    Patient may take own melatonin after arrival to sleep center. Do not drive or operate machinery after intake of melatonin.       Patient education: What is a sleep study?     What is a sleep study? -- A sleep study is a test that measures how well you sleep and checks for sleep problems. For some sleep studies, you stay overnight in a sleep lab at a hospital or sleep center.     What happens during a sleep study? -- Before you go to sleep, a technician attaches small, sticky patches called electrodes to your head, chest, and legs. He or she will also place a small tube beneath your nose and might wrap 1 or 2 belts around your chest.   Each of these items has wires that connect to monitors. The monitors record your movement, brain activity, breathing, and other body functions while you sleep.  If you have a history of trouble falling asleep, your doctor might prescribe a medicine to help you fall asleep in the lab. If you have never taken the medicine before, your doctor might ask you take it on a night before your sleep study to see how it affects you.   Why might my doctor order a sleep study? -- Your doctor will order a sleep study if he or she thinks you have sleep apnea or a different condition that makes you:   ?Have sudden jerking leg movements while you sleep, called periodic limb movements.   ?Feel very sleepy during the day and fall asleep all of a sudden, called narcolepsy.   ?Have trouble falling asleep or staying asleep over a long period of time, called chronic insomnia.   ?Do odd things while  you sleep, such as walking.  How should I prepare for a sleep study? -- On the day of your sleep study, you should:   ?Avoid alcohol   ?Avoid drinking coffee, tea, sodas, and other drinks that have caffeine in the afternoon and evening   ?Take all of your regular medicines    The cost of care estimate line is [phone]. They are able to give the patient an estimate of the charges and also an estimate of their insurance coverage/patient responsibility.

## 2021-06-17 NOTE — PATIENT INSTRUCTIONS - HE
Patient Instructions by Kathia Pearson MD at 3/15/2019 10:30 AM     Author: Kathia Pearson MD Service: -- Author Type: Physician    Filed: 3/15/2019 11:22 AM Encounter Date: 3/15/2019 Status: Addendum    : Kathia Pearson MD (Physician)    Related Notes: Original Note by Kathia Pearson MD (Physician) filed at 3/15/2019 11:22 AM         Patient Education     Viral Gastroenteritis (Adult)    Gastroenteritis is commonly called the stomach flu. It is most often caused by a virus that affects the stomach and intestinal tract and usually lasts from 2 to 7 days. Common viruses causing gastroenteritis include norovirus, rotavirus, and hepatitis A. Non-viral causes of gastroenteritis include bacteria, parasites, and toxins.  The danger from repeated vomiting or diarrhea is dehydration. This is the loss of too much fluid from the body. When this occurs, body fluids must be replaced. Antibiotics do not help with this illness because it is usually viral.Simple home treatment will be helpful.  Symptoms of viral gastroenteritis may include:    Watery, loose stools    Stomach pain or abdominal cramps    Fever and chills    Nausea and vomiting    Loss of bowel control    Headache  Home care  Gastroenteritis is transmitted by contact with the stool or vomit of an infected person. This can occur from person to person or from contact with a contaminated surface.  Follow these guidelines when caring for yourself at home:    If symptoms are severe, rest at home for the next 24 hours or until you are feeling better.    Wash your hands with soap and water or use alcohol-based  to prevent the spread of infection. Wash your hands after touching anyone who is sick.    Wash your hands or use alcohol-based  after using the toilet and before meals. Clean the toilet after each use.  Remember these tips when preparing food:    People with diarrhea should not prepare or serve food to others. When  preparing foods, wash your hands before and after.    Wash your hands after using cutting boards, countertops, knives, or utensils that have been in contact with raw food.    Keep uncooked meats away from cooked and ready-to-eat foods.  Medicine  You may use acetaminophen or NSAID medicines like ibuprofen or naproxen to control fever unless another medicine was given. If you have chronic liver or kidney disease, talk with your healthcare provider before using these medicines. Also talk with your provider if you've had a stomach ulcer or gastrointestinal bleeding. Don't give aspirin to anyone under 18 years of age who is ill with a fever. It may cause severe liver damage. Don't use NSAIDS is you are already taking one for another condition (like arthritis) or are on aspirin (such as for heart disease or after a stroke).  If medicine for vomiting or diarrhea are prescribed, take these only as directed. Do not take over-the-counter medicines for vomiting or diarrhea unless instructed by your healthcare provider.  Diet  Follow these guidelines for food:    Water and liquids are important so you don't get dehydrated. Drink a small amount at a time or suck on ice chips if you are vomiting.    If you eat, avoid fatty, greasy, spicy, or fried foods.    Don't eat dairy if you have diarrhea. This can make diarrhea worse.    Avoid tobacco, alcohol, and caffeine which may worsen symptoms.  During the first 24 hours (the first full day), follow the diet below:    Beverages. Sports drinks, soft drinks without caffeine, ginger ale, mineral water (plain or flavored), decaffeinated tea and coffee. If you are very dehydrated, sports drinks aren't a good choice. They have too much sugar and not enough electrolytes. In this case, commercially available products called oral rehydration solutions, are best.    Soups. Eat clear broth, consommé, and bouillon.    Desserts. Eat gelatin, popsicles, and fruit juice bars.  During the next 24  hours (the second day), you may add the following to the above:    Hot cereal, plain toast, bread, rolls, and crackers    Plain noodles, rice, mashed potatoes, chicken noodle or rice soup    Unsweetened canned fruit (avoid pineapple), bananas    Limit fat intake to less than 15 grams per day. Do this by avoiding margarine, butter, oils, mayonnaise, sauces, gravies, fried foods, peanut butter, meat, poultry, and fish.    Limit fiber and avoid raw or cooked vegetables, fresh fruits (except bananas), and bran cereals.    Limit caffeine and chocolate. Don't use spices or seasonings other than salt.    Limit dairy products.    Avoid alcohol.  During the next 24 hours:    Gradually resume a normal diet as you feel better and your symptoms improve.    If at any time it starts getting worse again, go back to clear liquids until you feel better.  Follow-up care  Follow up with your healthcare provider, or as advised. Call your provider if you don't get better within 24 hours or if diarrhea lasts more than a week. Also follow up if you are unable to keep down liquids and get dehydrated. If a stool (diarrhea) sample was taken, call as directed for the results.  Call 911  Call 911 if any of these occur:    Trouble breathing    Chest pain    Confused    Severe drowsiness or trouble awakening    Fainting or loss of consciousness    Rapid heart rate    Seizure    Stiff neck  When to seek medical advice  Call your healthcare provider right away if any of these occur:    Abdominal pain that gets worse    Continued vomiting (unable to keep liquids down)    Frequent diarrhea (more than 5 times a day)    Blood in vomit or stool (black or red color)    Dark urine, reduced urine output, or extreme thirst    Weakness or dizziness    Drowsiness    Fever of 100.4 F (38 C) or higher, or as directed by your healthcare provider    New rash  Date Last Reviewed: 1/3/2016    3650-5076 [employer]. [address], [place]  [address]. All rights reserved. This information is not intended as a substitute for professional medical care. Always follow your healthcare professional's instructions.

## 2021-06-17 NOTE — TELEPHONE ENCOUNTER
Telephone Encounter by Sujatha Cifuentes at 9/15/2020  2:48 PM     Author: Sujatha Cifuentes Service: -- Author Type: --    Filed: 9/15/2020  2:48 PM Encounter Date: 9/11/2020 Status: Signed    : Sujatha Cifuentes APPROVED:    Approval start date: 6/15/2020  Approval end date:  9/15/2021    Pharmacy has been notified of approval and will contact patient when medication is ready for pickup.

## 2021-06-18 NOTE — PROGRESS NOTES
Subjective:   Liz comes in today because of a 6 week history of lightheadedness and dizziness.  Sometimes she feels like the room will spin.  This has been associated with mild headaches as well.  She denies any vision changes.  Denies double vision.  She has had no weakness of arms or legs.  Denies any speech concerns.  Spells can come on at any time.  Head movement does not always seem to bring on an episode of lightheadedness.  She denies associated nausea or vomiting.  She denies sweats or shaking with the episodes.  Appetite is been good.  She has not been placed on any new medications of any type.  No palpitations noted.  No chest pain has not.      Objective:  HEENT: Pupils equally round and reactive to light.  Conjunctiva clear.  Sinuses nontender.  Nasal mucosa clear with good airflow bilaterally.  TMs both are gray.  No erythema noted.  Pharynx is clear.  Neck: Neck reveals no bruits.  No lymphadenopathy or thyromegaly present.  Cardiac: There is a regular rhythm present.  No ectopy or murmur heard.  Lungs: Lungs are clear.  Patient is in no respiratory distress.  Neurologic: Cranial nerves all are intact.  Motor strength is normal in upper and lower extremities.  Gait was stable.  Sensory exam normal.  Speech was clear.  Patient answered all questions appropriately today.      Assessment:  1.  Dizziness.  Consistent with benign positional vertigo.  Not associated with any other neurologic deficits.  Rule out metabolic causes.      Plan:  Patient will have routine lab work done today.  Thyroid and B12 levels will be drawn.  She will try to hydrate well.  Trial of meclizine will be done if all labs are normal.  If symptoms persist she will see neurology.

## 2021-06-18 NOTE — PATIENT INSTRUCTIONS - HE
Patient Instructions by Kaylee Nugent MD at 10/22/2020  3:40 PM     Author: Kaylee Nugent MD Service: -- Author Type: Physician    Filed: 10/22/2020  3:59 PM Encounter Date: 10/22/2020 Status: Signed    : Kaylee Nugent MD (Physician)       Patient Education     Resources:    National Institutes of Mental Jjmltc307-003-0503xtb.[facility].nih.gov    National [place] on Mental Ytwyeue559-339-5606qoj.silva.org     Mental Health Gewavxs520-373-3212eou.[facility].org    National Suicide Fctssxa293-260-5712 (800-SUICIDE)    National Suicide Prevention Cpzdzivo450-364-5890 (284-447-CVXP)www.suicidepreventionlifeline.org    Date Last Reviewed: 4/1/2019 2000-2019 [employer]. [address] [place] [address]. All rights reserved. This information is not intended as a substitute for professional medical care. Always follow your healthcare professional's instructions.

## 2021-06-19 NOTE — LETTER
Letter by Kaylee Nugent MD at      Author: Kaylee Nugent MD Service: -- Author Type: --    Filed:  Encounter Date: 12/9/2019 Status: Signed         December 9, 2019     Patient: Liz June   YOB: 1995   Date of Visit: 12/9/2019       To Whom It May Concern:    It is my medical opinion that Liz June be excused from work on 12/9/19 due to medical condition. She is okay to resume work without restrictions on 12/10/19.    If you have any questions or concerns, please don't hesitate to call.    Sincerely,        Electronically signed by Kaylee Nugent MD

## 2021-06-19 NOTE — LETTER
Letter by Magnus Marshall DO at      Author: Magnus Marshall DO Service: -- Author Type: --    Filed:  Encounter Date: 7/15/2019 Status: (Other)       Liz June  [address] [address]       July 15, 2019       Dear Ms. June,      Enclosed is the report from your recent sleep study.     Please call with questions or contact us using [employer].    .     If you would like to discuss these results, please call [phone] to schedule a follow up appointment with .     Sincerely,      Sarah RAMESH [facility]  Sleep Medicine  7/15/2019 12:51 PM    C/O      Magnus Marshall DO

## 2021-06-19 NOTE — LETTER
Letter by Kathia Pearson MD at      Author: Kathia Pearosn MD Service: -- Author Type: --    Filed:  Encounter Date: 3/15/2019 Status: (Other)         March 15, 2019     Patient: Liz June   YOB: 1995   Date of Visit: 3/15/2019       To Whom it May Concern:    Liz June was seen in my clinic on 3/15/2019. Please excuse Liz from work yesterday and today 3/14/19-3/15/19 due to illness. She can return to work without restrictions when feeling better.    If you have any questions or concerns, please don't hesitate to call.    Sincerely,         Electronically signed by Kathia Pearson MD

## 2021-06-19 NOTE — LETTER
Letter by Kaylee Nugent MD at      Author: Kaylee Nugent MD Service: -- Author Type: --    Filed:  Encounter Date: 3/14/2019 Status: (Other)       March 14, 2019     Patient: Liz June   YOB: 1995   Date of Visit: 3/14/2019       To Whom It May Concern:    It is my medical opinion that Liz June be excused from work on 3/14/19 related to severe migraine.     If you have any questions or concerns, please don't hesitate to call.    Sincerely,        Electronically signed by Kaylee Nugent MD

## 2021-06-19 NOTE — LETTER
Letter by Kathia Pearson MD at      Author: Kathia Pearson MD Service: -- Author Type: --    Filed:  Encounter Date: 3/15/2019 Status: (Other)         March 15, 2019     Patient: Liz June   YOB: 1995   Date of Visit: 3/15/2019       To Whom It May Concern:    Liz June was seen in clinic today for illness.  Please excuse Liz from work yesterday and today 3/14/19-3/15/19 due to illness.  She can return to work without restrictions when feeling better.    If you have any questions or concerns, please don't hesitate to call.    Sincerely,        Electronically signed by Kathia Pearson MD

## 2021-06-20 NOTE — LETTER
Letter by Kaylee Nugent MD at      Author: Kaylee Nugent MD Service: -- Author Type: --    Filed:  Encounter Date: 1/29/2020 Status: (Other)                    My Depression Action Plan  Name: Liz June   Date of Birth 1995  Date: 1/29/2020    My Doctor: Kaylee Nugent MD   My Clinic: [facility]   [address] [place] [id number]  [phone]          GREEN    ZONE   Good Control    What it looks like:     Things are going generally well. You have normal ups and downs. You may even feel depressed from time to time, but bad moods usually last less than a day.   What you need to do:  1. Continue to care for yourself (see self care plan)  2. Check your depression survival kit and update it as needed  3. Follow your physicians recommendations including any medication.  4. Do not stop taking medication unless you consult with your physician first.           YELLOW         ZONE Getting Worse    What it looks like:     Depression is starting to interfere with your life.     It may be hard to get out of bed; you may be starting to isolate yourself from others.    Symptoms of depression are starting to last most all day and this has happened for several days.     You may have suicidal thoughts but they are not constant.   What you need to do:     1. Call your care team. Your response to treatment will improve if you keep your care team informed of your progress. Yellow periods are signs an adjustment may need to be made.     2. Continue your self-care.  Just get dressed and ready for the day.  Don't give yourself time to talk yourself out of it.    3. Talk to someone in your support network.    4. Open up your depression Depression Self-Care Plan / Wellness kit.           RED    ZONE Medical Alert - Get Help    What it looks like:     Depression is seriously interfering with your life.     You may experience these or other symptoms: You cant get out of bed most days, cant work or  engage in other necessary activities, you have trouble taking care of basic hygiene, or basic responsibilities, thoughts of suicide or death that will not go away, self-injurious behavior.     What you need to do:  1. Call your care team and request a same-day appointment. If they are not available (weekends or after hours) call your local crisis line, emergency room or 911.            Self-Care Plan / Wellness Kit    Self-Care for Depression  Heres the deal. Your body and mind are really not as separate as most people think.  What you do and think affects how you feel and how you feel influences what you do and think. This means if you do things that people who feel good do, it will help you feel better.  Sometimes this is all it takes.  There is also a place for medication and therapy depending on how severe your depression is, so be sure to consult with your medical provider and/ or Behavioral Health Consultant if your symptoms are worsening or not improving.     In order to better manage my stress, I will:    Exercise  Get some form of exercise, every day. This will help reduce pain and release endorphins, the feel good chemicals in your brain. This is almost as good as taking antidepressants!  This is not the same as joining a gym and then never going! (they count on that by the way?) It can be as simple as just going for a walk or doing some gardening, anything that will get you moving.      Hygiene   Maintain good hygiene (get out of bed in the morning, make your bed, brush your teeth, take a shower, and get dressed like you were going to work, even if you are unemployed).  If your clothes don't fit try to get ones that do.    Diet  Strive to eat foods that are good for me, drink plenty of water, and avoid excessive sugar, caffeine, alcohol, and other mood-altering substances.  Some foods that are helpful in depression are: complex carbohydrates, B vitamins, flaxseed, fish or fish oil, fresh fruits and  vegetables.    Psychotherapy  Agree to participate in Individual Therapy (if recommended).    Medication  If prescribed medications, I agree to take them.  Missing doses can result in serious side effects.  I understand that drinking alcohol, or other illicit drug use, may cause potential side effects.  I will not stop my medication abruptly without first discussing it with my provider.    Staying Connected With Others  Stay in touch with my friends, family members, and my primary care provider/team.    Use your imagination  Be creative.  We all have a creative side; it doesnt matter if its oil painting, sand castles, or mud pies! This will also kick up the endorphins.    Witness Beauty  (AKA stop and smell the roses) Take a look outside, even in mid-winter. Notice colors, textures. Watch the squirrels and birds.     Service to others  Be of service to others.  There is always someone else in need.  By helping others we can get out of ourselves and remember the really important things.  This also provides opportunities for practicing all the other parts of the program.    Humor  Laugh and be silly!  Adjust your TV habits for less news and crime-drama and more comedy.    Control your stress  Try breathing deep, massage therapy, biofeedback, and meditation. Find time to relax each day.     Crisis Text Line  http://www.crisistextline.org    The Crisis Text Line serves anyone, in any type of crisis, providing access to free, 24/7 support and information via the medium people already use and trust:    Here's how it works:  1.  Text 504-415 from anywhere in the [place], anytime, about any type of crisis.  2.  A live, trained Crisis Counselor receives the text and responds quickly.  3.  The volunteer Crisis Counselor will help you move from a 'hot moment to a cool moment'.  My support system    Clinic Contact:  Phone number:    Contact 1:  Phone number:    Contact 2:  Phone number:    Hindu/:  Phone  number:    Therapist:  Phone number:    [facility] crisis center:    Phone number:    Other community support:  Phone number:

## 2021-06-21 NOTE — PROGRESS NOTES
"SUBJECTIVE  Liz June is a 23 y.o. female here for:    HTN: has been stable on losartan 50 mg daily. Of note, she does have IUD placed for contraception. She was recently hospitalized for mental health/suicidal ideation this week and discharged yesterday. She was switched from effexor to duloxetine due to concerns that effexor was elevating her BP- she is still undergoing that taper currently. She had some elevated readings, up to 170/100 in the hospital. She reports that she had headaches during her hospitalization. Since being discharged, she denies any headaches, chest pain, shortness of breath. She reports good compliance with her losartan. She does not check her BP regularly. She smokes 1-2 cigarettes daily but is motivated to quit. She also is planning to start working on lifestyle changes to lose weight and eat healthier.    ROS  Complete 10 point review of systems negative except as noted above in HPI    Reviewed Past Medical History, Medications, Family History and Social History in Epic and up to date with no new changes.    OBJECTIVE  BP (!) 136/94 (Patient Site: Left Arm, Patient Position: Sitting, Cuff Size: Adult Large)   Pulse 82   Temp 98  F (36.7  C) (Oral)   Resp 16   Ht 5' 7\" (1.702 m)   Wt (!) 309 lb (140.2 kg)   SpO2 96%   BMI 48.40 kg/m       General: Cooperative, pleasant, in no acute distress  HEENT: NCAT, sclera clear, MMM  Neck: no lymphadenopathy, no masses  CV: RRR, normal S1/S2, no murmur, rubs, gallops  Resp: No respiratory distress. Clear to auscultation bilaterally. No wheezes, rales, rhonchi  Psych: Normal mood and affect    LABS & IMAGES   Results for orders placed or performed in visit on 06/11/18   Thyroid Cascade   Result Value Ref Range    TSH 1.47 0.30 - 5.00 uIU/mL   Vitamin B12   Result Value Ref Range    Vitamin B-12 480 213 - 816 pg/mL   Comprehensive Metabolic Panel   Result Value Ref Range    Sodium 142 136 - 145 mmol/L    Potassium 4.7 3.5 - 5.0 mmol/L    " Chloride 105 98 - 107 mmol/L    CO2 26 22 - 31 mmol/L    Anion Gap, Calculation 11 5 - 18 mmol/L    Glucose 88 70 - 125 mg/dL    BUN 12 8 - 22 mg/dL    Creatinine 0.78 0.60 - 1.10 mg/dL    GFR MDRD Af Amer >60 >60 mL/min/1.73m2    GFR MDRD Non Af Amer >60 >60 mL/min/1.73m2    Bilirubin, Total 0.5 0.0 - 1.0 mg/dL    Calcium 9.7 8.5 - 10.5 mg/dL    Protein, Total 7.0 6.0 - 8.0 g/dL    Albumin 3.9 3.5 - 5.0 g/dL    Alkaline Phosphatase 93 45 - 120 U/L    AST 20 0 - 40 U/L    ALT 31 0 - 45 U/L   HM2(CBC w/o Differential)   Result Value Ref Range    WBC 8.9 4.0 - 11.0 thou/uL    RBC 4.65 3.80 - 5.40 mill/uL    Hemoglobin 13.6 12.0 - 16.0 g/dL    Hematocrit 40.3 35.0 - 47.0 %    MCV 87 80 - 100 fL    MCH 29.2 27.0 - 34.0 pg    MCHC 33.7 32.0 - 36.0 g/dL    RDW 12.4 11.0 - 14.5 %    Platelets 308 140 - 440 thou/uL    MPV 7.8 7.0 - 10.0 fL         ASSESSMENT/PLAN:   Liz was seen today for hypertension.    Diagnoses and all orders for this visit:    Essential hypertension: Diastolic BP mildly elevated today; however in the setting of previously being well controlled on losartan, agreed to continue this dose and to work on lifestyle changes, including smoking cessation, exercise, weight loss. Will recheck in 2 weeks and if still elevated, will increase dose of losartan. I did discuss that ARB is contraindicated in pregnancy; however she has reliable contraception with IUD in place, so will continue ARB for now. In the future, will consider transitioning to a medication with better safety profile in pregnancy.    Major depressive disorder, recurrent episode, moderate (H): Doing better since hospitalization and was discharged yesterday. Has follow-up appointment today to establish care with therapist and has upcoming appointment with psychiatrist as well. She continues to take duloxetine and is tapering off effexor due to BP concerns (per psychiatrist). She is also on trazodone at bedtime.     Morbid Obesity: BMI 48.  Discussed dietary and exercise changes- she is motivated to start focusing on her health. Offered support.      Return in about 2 weeks (around 11/30/2018) for Blood pressure check.       Options for treatment and follow-up care were reviewed with the patient. Liz June and/or guardian was engaged and actively involved in the decision making process. Liz June and/or guardian verbalized understanding of the options discussed and was satisfied with the final plan.      Kaylee Nugent MD

## 2021-06-21 NOTE — LETTER
Letter by Kaylee Nugent MD at      Author: Kaylee Nugent MD Service: -- Author Type: --    Filed:  Encounter Date: 1/14/2021 Status: (Other)         January 14, 2021     Patient: Liz June   YOB: 1995   Date of Visit: 1/14/2021       To Whom It May Concern:    It is my medical opinion that Liz June was diagnosed with Covid-19 on 12/29/20. From an infectious standpoint, she is cleared to return to work; however, she has persistent respiratory symptoms requiring her to remain off work. She is okay to return to work without restrictions on 1/25/21.     If you have any questions or concerns, please don't hesitate to call.    Sincerely,        Electronically signed by Kaylee Nugent MD

## 2021-06-22 NOTE — TELEPHONE ENCOUNTER
Reviewed chart. Appears Dr. Randolph has refilled her cymbalta. I would be happy to prescribe this medication after establishing care as well. I am accepting new patients and she can be added to my schedule. Please call patient and let her know. Thanks!

## 2021-06-25 NOTE — PROGRESS NOTES
Name: Liz June  Age: 23 y.o.  Gender: female  : 1995  Date of Encounter: 3/15/2019      HPI:  Liz June is a 23 y.o.  female with history of hypertension who presents to the clinic with concerns of nausea and vomiting for the last 3 days.  Patient reports associated headache, mild generalized epigastric discomfort and body aches.  Patient had 6-7 episodes of emesis yesterday and 3 today.  Has been drinking fluids well but minimal solid intake.  Patient has voided twice today.  Denies fever, dry mouth and lips, head congestion, runny nose, ear pain, sore throat, cough, wheezing, shortness of breath, chest pain, back pain, urinary frequency and skin rash.  Patient lives with 2 other roommates who are currently healthy.  Patient works as a therapist in elementary school.    Current Medication:   Medications reviewed and updated.    Current Outpatient Medications:      cholecalciferol, vitamin D3, 5,000 unit capsule, TAKE 1 CAPSULE (5,000 UNITS TOTAL) BY MOUTH DAILY. *OTC NOT COVERED**, Disp: 90 capsule, Rfl: 1     DULoxetine (CYMBALTA) 30 MG capsule, Take 2 caps by mouth daily.., Disp: 60 capsule, Rfl: 3     levonorgestrel (MATTIE) 14 mcg/24 hour (3 years) IUD, by Intrauterine route., Disp: , Rfl:      losartan (COZAAR) 50 MG tablet, Take 1 tablet (50 mg total) by mouth daily., Disp: 90 tablet, Rfl: 3     traZODone (DESYREL) 50 MG tablet, Take 50 mg by mouth., Disp: , Rfl:      fluticasone (FLONASE) 50 mcg/actuation nasal spray, One spray in both nostrils twice daily until symptoms clear., Disp: 18 g, Rfl: 2    Past Med / Surg History:  Past Medical History:   Diagnosis Date     Suicide attempt (H)      No past surgical history on file.    Fam / Soc History:  No family history on file.  Social History     Socioeconomic History     Marital status: Single     Spouse name: Not on file     Number of children: Not on file     Years of education: Not on file     Highest education level: Not on file    Occupational History     Not on file   Social Needs     Financial resource strain: Not on file     Food insecurity:     Worry: Not on file     Inability: Not on file     Transportation needs:     Medical: Not on file     Non-medical: Not on file   Tobacco Use     Smoking status: Former Smoker     Types: Cigarettes     Last attempt to quit: 2016     Years since quittin.8     Smokeless tobacco: Never Used     Tobacco comment: pt smokes mostly on the weekends, she does have family members who also smoke outside   Substance and Sexual Activity     Alcohol use: No     Drug use: No     Sexual activity: Yes     Partners: Male     Birth control/protection: IUD   Lifestyle     Physical activity:     Days per week: Not on file     Minutes per session: Not on file     Stress: Not on file   Relationships     Social connections:     Talks on phone: Not on file     Gets together: Not on file     Attends Spiritism service: Not on file     Active member of club or organization: Not on file     Attends meetings of clubs or organizations: Not on file     Relationship status: Not on file     Intimate partner violence:     Fear of current or ex partner: Not on file     Emotionally abused: Not on file     Physically abused: Not on file     Forced sexual activity: Not on file   Other Topics Concern     Not on file   Social History Narrative     Not on file       ROS:  14 point review of systems unremarkable except as mentioned in HPI  Review of Systems   Constitutional: Positive for appetite change and fatigue. Negative for chills and fever.   HENT: Negative for congestion, rhinorrhea and sore throat.    Eyes: Negative for discharge and redness.   Respiratory: Negative for cough, chest tightness and wheezing.    Cardiovascular: Negative for chest pain.   Gastrointestinal: Positive for abdominal pain, nausea and vomiting. Negative for diarrhea.   Genitourinary: Negative for dysuria, flank pain, frequency and urgency.    Musculoskeletal: Negative for back pain and myalgias.   Skin: Negative for rash.   Neurological: Positive for headaches. Negative for weakness.       Objective:  Vitals: BP (!) 144/91 (Patient Site: Right Arm, Patient Position: Sitting, Cuff Size: Adult Large)   Pulse 70   Temp 97.9  F (36.6  C) (Oral)   Resp 20   Wt (!) 305 lb 6 oz (138.5 kg)   LMP  (LMP Unknown)   SpO2 97%   BMI 47.83 kg/m      Gen: Alert, awake, well appearing  HEENT: NC, AT,   Ears:  Ear canals clear.  TMs normal appearing.  Eyes:  EOMI.  Pupils equally round and reactive to light. Conjunctivae clear.  Sclera non-icteric.  Nose:  Nasal mucosa pink, septum midline.  No sinus tenderness  Mouth:  MMM. Oropharynx clear. No tonsillar exudate. Teeth, gum, tongue and lips clear.  Neck:  Supple, FROM, No lymphandenpathy, No TM  Heart: Regular rate and rhythm; normal S1 and S2; no murmurs, gallops, or rubs.  Peripheral Vessels: Normal pulses and perfusion.  Lungs: Unlabored respirations; symmetric chest expansion; clear breath sounds.  Abdomen:  Bowel sounds present.  Soft, minimal generalized epigastric discomfort with palpation, non distended, no guarding or rebound, no hepatosplenomegaly,  No masses palpable.  Back:  Spine straight, no obvious deformities, no spinal or CVA tenderness.  Skin: Normal turgor and without lesions or rashes.  Mental Status: Alert, oriented, in no distress. Mood and affect appropriate.  Neuro: Normal tone; no focal deficits appreciated.Gait and stance normal.     Pertinent results / imaging:  none    Assessment: Gastroenteritis    Plan: Reviewed diagnosis with patient.  Prescribed ondansetron 3 times daily as needed for nausea and vomiting.  Advised fluids, rest, Tylenol as needed for fever discomfort and reviewed diet recommendations.  Discussed expected course, duration of symptoms and reasons to return for reevaluation.  Patient voiced understanding and was in agreement with plan.    Kathia Pearson  MD  3/15/2019

## 2021-06-25 NOTE — TELEPHONE ENCOUNTER
RN cannot approve Refill Request    RN can NOT refill this medication PCP messaged that patient is overdue for Labs. Last office visit: 1/29/2020 Kaylee Nugent MD Last Physical: Visit date not found Last MTM visit: Visit date not found Last visit same specialty: 1/29/2020 Kaylee Nugent MD.  Next visit within 3 mo: Visit date not found  Next physical within 3 mo: Visit date not found      Dolly Gonzalez, [facility] Triage/Med Refill 6/5/2021    Requested Prescriptions   Pending Prescriptions Disp Refills     losartan (COZAAR) 50 MG tablet [Pharmacy Med Name: LOSARTAN 50MG TABLETS] 300 tablet 0     Sig: TAKE 1 TABLET BY MOUTH EVERY DAY       Angiotensin Receptor Blocker Protocol Failed - 6/5/2021  3:38 AM        Failed - Serum potassium within the past 12 months     No results found for: LN-POTASSIUM          Failed - Blood pressure filed in past 12 months     BP Readings from Last 1 Encounters:   01/29/20 110/84             Failed - Serum creatinine within the past 12 months     Creatinine   Date Value Ref Range Status   06/11/2018 0.78 0.60 - 1.10 mg/dL Final             Passed - PCP or prescribing provider visit in past 12 months       Last office visit with prescriber/PCP: 1/29/2020 Kaylee Nugent MD OR same dept: Visit date not found OR same specialty: 1/29/2020 Kaylee Nugent MD  Last physical: Visit date not found Last MTM visit: Visit date not found   Next visit within 3 mo: Visit date not found  Next physical within 3 mo: Visit date not found  Prescriber OR PCP: Kaylee Nugent MD  Last diagnosis associated with med order: 1. Hypertension  - losartan (COZAAR) 50 MG tablet [Pharmacy Med Name: LOSARTAN 50MG TABLETS]; TAKE 1 TABLET BY MOUTH EVERY DAY  Dispense: 300 tablet; Refill: 0    If protocol passes may refill for 12 months if within 3 months of last provider visit (or a total of 15 months).

## 2021-07-04 NOTE — ADDENDUM NOTE
Addendum Note by Kaylee Nugent MD at 2/11/2021 12:07 PM     Author: Kaylee Nugent MD Service: -- Author Type: Physician    Filed: 2/11/2021 12:07 PM Encounter Date: 2/10/2021 Status: Signed    : Kaylee Nugent MD (Physician)    Addended by: KAYLEE NUGENT on: 2/11/2021 12:07 PM        Modules accepted: Orders

## 2021-07-14 PROBLEM — Z97.5 IUD (INTRAUTERINE DEVICE) IN PLACE: Status: RESOLVED | Noted: 2019-04-05 | Resolved: 2020-10-22

## 2021-08-07 ENCOUNTER — HEALTH MAINTENANCE LETTER (OUTPATIENT)
Age: 26
End: 2021-08-07

## 2021-08-16 ENCOUNTER — MYC MEDICAL ADVICE (OUTPATIENT)
Dept: FAMILY MEDICINE | Facility: CLINIC | Age: 26
End: 2021-08-16

## 2021-08-16 DIAGNOSIS — L71.0 PERIORAL DERMATITIS: Primary | ICD-10-CM

## 2021-08-17 NOTE — TELEPHONE ENCOUNTER
Last office visit: 04/08/2021 DR BREEN  Last ordered: N/A - no historical referrals   Last lab check: N/A   Next appointment: With Family Practice (Kaylee Nugent MD) 09/15/2021 at 4:20 PM    Chart reviewed. Please review findings below.      Liz June is 25 y.o. and presents today for the following health issues   HPI -25-year-old female had a diagnosis of perioral dermatitis several years ago that responded well to metronidazole cream. Over the past month she has had a recurrence of a mild patchy rash around the mouth. It is not itchy or painful. Otherwise, she has been feeling well. She does not have any other concerns.    Perioral dermatitis  Discussed risks and benefits of the treatment with the patient and indications for follow-up.  - metroNIDAZOLE (METROCREAM) 0.75 % cream  Dispense: 45 g; Refill: 4

## 2021-08-20 RX ORDER — TRAZODONE HYDROCHLORIDE 50 MG/1
150 TABLET, FILM COATED ORAL AT BEDTIME
Qty: 90 TABLET | Refills: 11 | OUTPATIENT
Start: 2021-08-20

## 2021-08-20 NOTE — TELEPHONE ENCOUNTER
Reviewed [facility] archive for [facility]- appears Dr. Cabrales ordered trazodone 50 mg #90 with 11 refills in March. She should have enough refills    Kaylee Nugent

## 2021-08-23 ENCOUNTER — VIRTUAL VISIT (OUTPATIENT)
Dept: URGENT CARE | Facility: CLINIC | Age: 26
End: 2021-08-23
Payer: COMMERCIAL

## 2021-08-23 DIAGNOSIS — R11.2 NON-INTRACTABLE VOMITING WITH NAUSEA, UNSPECIFIED VOMITING TYPE: Primary | ICD-10-CM

## 2021-08-23 PROCEDURE — 99213 OFFICE O/P EST LOW 20 MIN: CPT | Mod: 95 | Performed by: EMERGENCY MEDICINE

## 2021-08-23 RX ORDER — ONDANSETRON 4 MG/1
4 TABLET, ORALLY DISINTEGRATING ORAL EVERY 8 HOURS PRN
Qty: 12 TABLET | Refills: 0 | Status: SHIPPED | OUTPATIENT
Start: 2021-08-23 | End: 2022-01-05

## 2021-08-23 NOTE — PROGRESS NOTES
Video visit:    Start time: 3:47 PM  Stop time: 4:00 PM    Assessment: 4-day history of nausea, vomiting, headache, dizziness, abdominal fullness, and diarrhea.  She is having 4 episodes of diarrhea per day.  She has had some ondansetron at home with no improvement.  She is however able to take in fluids periodically and eat small amounts.  No respiratory symptoms negative Covid testing 10 days ago.    Plan: Increase ondansetron to 8 mg for 1-2 doses only.  Imodium A-D for diarrhea.  Hydrate.  Recheck 3 to 4 days if no improvement, sooner if worse.    CHIEF COMPLAINT: Vomiting and diarrhea.      HPI: Patient is a 26-year-old female whose been ill for 4 days.  She just started working at a  and thinks the illness is related to that.  She has had nausea, intermittent vomiting, headache, slight dizziness, and daily episodes of diarrhea for 4 days.  She has had approximately 4 diarrheal episodes per day for 4 days.  She has ondansetron at home which she has been taking with minimal improvement only.  No chronic GI illness.  No suspicious food in that part or eat same food without illness.  No reported GI illness from .  Patient tested negative for Covid 2 weeks ago.      ROS: See HPI otherwise normal.    Allergies   Allergen Reactions     Amoxicillin Hives      Current Outpatient Medications   Medication Sig Dispense Refill     ondansetron (ZOFRAN-ODT) 4 MG ODT tab Take 1 tablet (4 mg) by mouth every 8 hours as needed for nausea 12 tablet 0     albuterol (PROAIR HFA/PROVENTIL HFA/VENTOLIN HFA) 108 (90 Base) MCG/ACT inhaler Inhale 2 puffs into the lungs every 6 hours 18 g 0     cholecalciferol (D 5000) 125 MCG (5000 UT) CAPS TAKE 1 CAPSULE (5,000 UNITS TOTAL) BY MOUTH DAILY. *OTC NOT COVERED**       DULoxetine (CYMBALTA) 60 MG capsule Take 60 mg by mouth       fluticasone (FLONASE) 50 MCG/ACT nasal spray INSTILL 2 SPRAYS INTO BOTH NOSTRILS DAILY 16 mL 0     losartan (COZAAR) 50 MG tablet Take 50 mg by mouth            PE: No acute distress on video visit.  She is alert.  Nondyspneic appearing looks generally well hydrated.        TREATMENT: None.      ASSESSMENT: Vomiting and diarrhea with other constitutional symptoms.  Probable viral etiology but close monitoring is necessary.  She will have her ondansetron increase for 2 doses only and hydrate with close follow-up.      DIAGNOSIS: Vomiting and diarrhea.      PLAN: Ondansetron as discussed.  Electrolyte rich fluids for hydration.  Over-the-counter antidiarrheal medicines.  Go to ER if more ill.  Recheck 4 to 5 days if still ill.    Time: 13 minutes

## 2021-09-15 ENCOUNTER — OFFICE VISIT (OUTPATIENT)
Dept: FAMILY MEDICINE | Facility: CLINIC | Age: 26
End: 2021-09-15
Payer: COMMERCIAL

## 2021-09-15 ENCOUNTER — ANCILLARY PROCEDURE (OUTPATIENT)
Dept: GENERAL RADIOLOGY | Facility: CLINIC | Age: 26
End: 2021-09-15
Attending: FAMILY MEDICINE
Payer: COMMERCIAL

## 2021-09-15 VITALS
DIASTOLIC BLOOD PRESSURE: 95 MMHG | HEIGHT: 67 IN | OXYGEN SATURATION: 97 % | HEART RATE: 91 BPM | BODY MASS INDEX: 45.99 KG/M2 | TEMPERATURE: 99.1 F | SYSTOLIC BLOOD PRESSURE: 150 MMHG | WEIGHT: 293 LBS

## 2021-09-15 DIAGNOSIS — R06.02 SHORTNESS OF BREATH: ICD-10-CM

## 2021-09-15 DIAGNOSIS — I10 ESSENTIAL HYPERTENSION: Primary | ICD-10-CM

## 2021-09-15 DIAGNOSIS — G47.19 EXCESSIVE DAYTIME SLEEPINESS: ICD-10-CM

## 2021-09-15 DIAGNOSIS — E66.813 CLASS 3 SEVERE OBESITY WITH SERIOUS COMORBIDITY AND BODY MASS INDEX (BMI) OF 45.0 TO 49.9 IN ADULT, UNSPECIFIED OBESITY TYPE (H): ICD-10-CM

## 2021-09-15 DIAGNOSIS — Z23 NEED FOR IMMUNIZATION AGAINST INFLUENZA: ICD-10-CM

## 2021-09-15 DIAGNOSIS — K21.9 GASTROESOPHAGEAL REFLUX DISEASE WITHOUT ESOPHAGITIS: ICD-10-CM

## 2021-09-15 DIAGNOSIS — E66.01 CLASS 3 SEVERE OBESITY WITH SERIOUS COMORBIDITY AND BODY MASS INDEX (BMI) OF 45.0 TO 49.9 IN ADULT, UNSPECIFIED OBESITY TYPE (H): ICD-10-CM

## 2021-09-15 DIAGNOSIS — J45.40 MODERATE PERSISTENT ASTHMA, UNSPECIFIED WHETHER COMPLICATED: ICD-10-CM

## 2021-09-15 DIAGNOSIS — E55.9 VITAMIN D DEFICIENCY: ICD-10-CM

## 2021-09-15 PROBLEM — L71.0 PERIORAL DERMATITIS: Status: ACTIVE | Noted: 2021-04-08

## 2021-09-15 LAB
ANION GAP SERPL CALCULATED.3IONS-SCNC: 13 MMOL/L (ref 5–18)
BUN SERPL-MCNC: 13 MG/DL (ref 8–22)
CALCIUM SERPL-MCNC: 9.8 MG/DL (ref 8.5–10.5)
CHLORIDE BLD-SCNC: 101 MMOL/L (ref 98–107)
CHOLEST SERPL-MCNC: 190 MG/DL
CO2 SERPL-SCNC: 24 MMOL/L (ref 22–31)
CREAT SERPL-MCNC: 0.93 MG/DL (ref 0.6–1.3)
ERYTHROCYTE [DISTWIDTH] IN BLOOD BY AUTOMATED COUNT: 12.7 % (ref 10–15)
FASTING STATUS PATIENT QL REPORTED: NO
GFR SERPL CREATININE-BSD FRML MDRD: 84 ML/MIN/1.73M2
GLUCOSE BLD-MCNC: 86 MG/DL (ref 70–125)
HBA1C MFR BLD: 5.1 % (ref 0–5.6)
HCT VFR BLD AUTO: 39.3 % (ref 35–53)
HDLC SERPL-MCNC: 47 MG/DL
HGB BLD-MCNC: 13.5 G/DL (ref 11.7–17.7)
LDLC SERPL CALC-MCNC: 119 MG/DL
MCH RBC QN AUTO: 29.2 PG (ref 26.5–33)
MCHC RBC AUTO-ENTMCNC: 34.4 G/DL (ref 31.5–36.5)
MCV RBC AUTO: 85 FL (ref 78–100)
PLATELET # BLD AUTO: 274 10E3/UL (ref 150–450)
POTASSIUM BLD-SCNC: 4.1 MMOL/L (ref 3.5–5)
RBC # BLD AUTO: 4.62 10E6/UL (ref 3.8–5.9)
SODIUM SERPL-SCNC: 138 MMOL/L (ref 136–145)
TRIGL SERPL-MCNC: 119 MG/DL
WBC # BLD AUTO: 7.8 10E3/UL (ref 4–11)

## 2021-09-15 PROCEDURE — 85027 COMPLETE CBC AUTOMATED: CPT | Performed by: FAMILY MEDICINE

## 2021-09-15 PROCEDURE — 80061 LIPID PANEL: CPT | Performed by: FAMILY MEDICINE

## 2021-09-15 PROCEDURE — 83036 HEMOGLOBIN GLYCOSYLATED A1C: CPT | Performed by: FAMILY MEDICINE

## 2021-09-15 PROCEDURE — 90686 IIV4 VACC NO PRSV 0.5 ML IM: CPT | Performed by: FAMILY MEDICINE

## 2021-09-15 PROCEDURE — 99214 OFFICE O/P EST MOD 30 MIN: CPT | Mod: 25 | Performed by: FAMILY MEDICINE

## 2021-09-15 PROCEDURE — 90471 IMMUNIZATION ADMIN: CPT | Performed by: FAMILY MEDICINE

## 2021-09-15 PROCEDURE — 82306 VITAMIN D 25 HYDROXY: CPT | Performed by: FAMILY MEDICINE

## 2021-09-15 PROCEDURE — 71046 X-RAY EXAM CHEST 2 VIEWS: CPT | Mod: TC | Performed by: RADIOLOGY

## 2021-09-15 PROCEDURE — 36415 COLL VENOUS BLD VENIPUNCTURE: CPT | Performed by: FAMILY MEDICINE

## 2021-09-15 PROCEDURE — 80048 BASIC METABOLIC PNL TOTAL CA: CPT | Performed by: FAMILY MEDICINE

## 2021-09-15 RX ORDER — FLUTICASONE PROPIONATE 110 UG/1
1 AEROSOL, METERED RESPIRATORY (INHALATION) 2 TIMES DAILY
Qty: 12 G | Refills: 11 | Status: SHIPPED | OUTPATIENT
Start: 2021-09-15 | End: 2022-10-09

## 2021-09-15 RX ORDER — HYDROXYZINE HYDROCHLORIDE 25 MG/1
25 TABLET, FILM COATED ORAL PRN
COMMUNITY
Start: 2020-11-05 | End: 2024-07-19

## 2021-09-15 RX ORDER — TRAZODONE HYDROCHLORIDE 50 MG/1
50-150 TABLET, FILM COATED ORAL
COMMUNITY
Start: 2021-03-23 | End: 2022-03-28

## 2021-09-15 RX ORDER — ESCITALOPRAM OXALATE 10 MG/1
10 TABLET ORAL DAILY
COMMUNITY
Start: 2021-07-15 | End: 2021-12-20

## 2021-09-15 RX ORDER — IRON HEME POLYPEPTIDE/FOLIC AC 12-1MG
1 TABLET ORAL DAILY
Qty: 90 CAPSULE | Refills: 3 | Status: SHIPPED | OUTPATIENT
Start: 2021-09-15 | End: 2022-10-04

## 2021-09-15 ASSESSMENT — MIFFLIN-ST. JEOR: SCORE: 2165.84

## 2021-09-15 NOTE — PROGRESS NOTES
"SUBJECTIVE  Liz June is a 26 year old adult here for:    Chief Complaint   Patient presents with     Breathing Problem     for over a year and half     Lungs/chest feel \"on fire\"  Over 1 year duration  History of Covid-19 Jan 2020  History of asthma- on albuterol prn  Does have wheezing intermittently  Not positional. Denies orthopnea  \"tight\" \"Burning\"  Mid-sternum    HTN: on losartan  Due for labs today    Needs referral for sleep study    Due for flu shot      ROS  See HPI    Reviewed Past Medical History, Medications, Family History and Social History in Epic and up to date with no new changes.    OBJECTIVE  BP (!) 150/95   Pulse 91   Temp 99.1  F (37.3  C) (Oral)   Ht 1.689 m (5' 6.5\")   Wt 140.6 kg (310 lb)   LMP 08/17/2021 (Exact Date)   SpO2 97%   BMI 49.29 kg/m       General: Cooperative, pleasant, in no acute distress  CV: RRR, normal S1/S2, no murmur, rubs, gallops  Resp: No respiratory distress. No crackles or rhonchi. Occasional expiratory wheeze.      LABS & IMAGES   Results for orders placed or performed in visit on 09/15/21   XR Chest 2 Views     Status: None    Narrative    EXAM: XR CHEST 2 VW  LOCATION: [facility]  DATE/TIME: 9/15/2021 5:35 PM    INDICATION:  Shortness of breath  COMPARISON: None.      Impression    IMPRESSION: Negative chest.   Results for orders placed or performed in visit on 09/15/21   Basic metabolic panel  (Ca, Cl, CO2, Creat, Gluc, K, Na, BUN)     Status: Normal   Result Value Ref Range    Sodium 138 136 - 145 mmol/L    Potassium 4.1 3.5 - 5.0 mmol/L    Chloride 101 98 - 107 mmol/L    Carbon Dioxide (CO2) 24 22 - 31 mmol/L    Anion Gap 13 5 - 18 mmol/L    Urea Nitrogen 13 8 - 22 mg/dL    Creatinine 0.93 0.60 - 1.30 mg/dL    Calcium 9.8 8.5 - 10.5 mg/dL    Glucose 86 70 - 125 mg/dL    GFR Estimate 84 >60 mL/min/1.73m2    Narrative    The sex of this patient cannot be reliably determined based on discrepancies in demographics (legal sex, " sex assigned at birth, gender identity).  Both male and female reference ranges are provided where applicable.  Careful evaluation of the patient s results as compared to the gender specific reference intervals is required in this setting.    Lipid panel reflex to direct LDL Non-fasting     Status: None   Result Value Ref Range    Cholesterol 190 <=199 mg/dL    Triglycerides 119 <=149 mg/dL    Direct Measure HDL 47 >=40 mg/dL    LDL Cholesterol Calculated 119 <=129 mg/dL    Patient Fasting > 8hrs? No     Narrative    The sex of this patient cannot be reliably determined based on discrepancies in demographics (legal sex, sex assigned at birth, gender identity).  Both male and female reference ranges are provided where applicable.  Careful evaluation of the patient s results as compared to the gender specific reference intervals is required in this setting.    Hemoglobin A1c     Status: Normal   Result Value Ref Range    Hemoglobin A1C 5.1 0.0 - 5.6 %   Vitamin D deficiency screening     Status: Normal   Result Value Ref Range    Vitamin D, Total (25-Hydroxy) 52 30 - 80 ug/L    Narrative    Deficiency <10.0 ug/L  Insufficiency 10.0-29.9 ug/L  Sufficiency 30.0-80.0 ug/L  Toxicity (possible) >100.0 ug/L    CBC with platelets     Status: Normal   Result Value Ref Range    WBC Count 7.8 4.0 - 11.0 10e3/uL    RBC Count 4.62 3.80 - 5.90 10e6/uL    Hemoglobin 13.5 11.7 - 17.7 g/dL    Hematocrit 39.3 35.0 - 53.0 %    MCV 85 78 - 100 fL    MCH 29.2 26.5 - 33.0 pg    MCHC 34.4 31.5 - 36.5 g/dL    RDW 12.7 10.0 - 15.0 %    Platelet Count 274 150 - 450 10e3/uL    Narrative    The sex of this patient cannot be reliably determined based on discrepancies in demographics (legal sex, sex assigned at birth, gender identity).  Both male and female reference ranges are provided where applicable.  Careful evaluation of the patient s results as compared to the gender specific reference intervals is required in this setting.           ASSESSMENT/PLAN:   Liz was seen today for breathing problem.    Diagnoses and all orders for this visit:    Essential hypertension: Not at goal- patient will check ambulatory blood pressure readings and send me via [facility]. Labs today. If BP elevated at home, would increase losartan to 100 mg daily.  -     Basic metabolic panel  (Ca, Cl, CO2, Creat, Gluc, K, Na, BUN); Future  -     CBC with platelets; Future  -     Basic metabolic panel  (Ca, Cl, CO2, Creat, Gluc, K, Na, BUN)  -     CBC with platelets  -     Comprehensive Weight Management; Future    Need for immunization against influenza  -     INFLUENZA VACCINE IM >6 MO VALENT IIV4 (ALFURIA/FLUZONE)    Vitamin D deficiency: Check vitamin D- refilled.  -     cholecalciferol (DIALYVITE VITAMIN D 5000) 125 MCG (5000 UT) CAPS; Take 1 capsule (5,000 Units) by mouth daily  -     Vitamin D deficiency screening; Future  -     Vitamin D deficiency screening    Excessive daytime sleepiness: High STOP-GAP score, refer for sleep study to evaluate.  -     Sleep Home Study Referral; Future    Class 3 severe obesity with serious comorbidity and body mass index (BMI) of 45.0 to 49.9 in adult, unspecified obesity type (H): Reviewed options- labs done today. Placed referral to comprehensive weight management clinic.  -     Lipid panel reflex to direct LDL Non-fasting; Future  -     Hemoglobin A1c; Future  -     Lipid panel reflex to direct LDL Non-fasting  -     Hemoglobin A1c  -     Comprehensive Weight Management; Future    Shortness of breath/  Moderate persistent asthma, unspecified whether complicated: Chronic symptoms- see above in HPI for details. Likely etiology is uncontrolled asthma/reactive airways. Some expiratory wheezing on exam. Will add controller inhaler and recheck in 1 month. CXR clear.   -     XR Chest 2 Views; Future  -     fluticasone (FLOVENT HFA) 110 MCG/ACT inhaler; Inhale 1 puff into the lungs 2 times daily    Gastroesophageal reflux disease  without esophagitis: Refilled.   -     omeprazole (PRILOSEC) 20 MG DR capsule; Take 1 capsule (20 mg) by mouth daily    35 minutes spent on the date of the encounter doing chart review, history and exam, documentation and further activities as noted above        Options for treatment and follow-up care were reviewed with the patient. Liz uJne and/or guardian was engaged and actively involved in the decision making process. Liz June and/or guardian verbalized understanding of the options discussed and was satisfied with the final plan.      Kaylee Nugent MD

## 2021-09-16 LAB — DEPRECATED CALCIDIOL+CALCIFEROL SERPL-MC: 52 UG/L (ref 30–80)

## 2021-09-26 PROBLEM — E66.01 MORBID OBESITY (H): Status: ACTIVE | Noted: 2021-09-26

## 2021-10-06 NOTE — TELEPHONE ENCOUNTER
REFERRAL INFORMATION:    Referring Provider:  Dr. Kaylee Nugent    Referring Clinic:  [facility]     Reason for Visit/Diagnosis: New Surg, BMI 45+       FUTURE VISIT INFORMATION:    Appointment Date: 11/18/2021    Appointment Time: 8:45 AM      NOTES RECORD STATUS  DETAILS   OFFICE NOTE from Referring Provider Internal 9/15/2021 Office visit with Dr. Nugent     OFFICE NOTE from Other Specialists Internal 5/28/19 Office visit with Dr. Magnus Marshall ([facility])    12/20/16, 6/2/16 [facility] Karen Fernandez [facility]   HOSPITAL DISCHARGE SUMMARY/ ED VISITS  N/A    OPERATIVE REPORT N/A    ENDOSCOPY (EGD)  N/A    PERTINENT LABS Internal/ Care Everywhere    PATHOLOGY REPORTS (RELATED) N/A    IMAGING (CT, MRI, US, XR)  N/A

## 2021-10-09 ENCOUNTER — MYC MEDICAL ADVICE (OUTPATIENT)
Dept: FAMILY MEDICINE | Facility: CLINIC | Age: 26
End: 2021-10-09

## 2021-10-18 ENCOUNTER — TRANSFERRED RECORDS (OUTPATIENT)
Dept: HEALTH INFORMATION MANAGEMENT | Facility: CLINIC | Age: 26
End: 2021-10-18

## 2021-10-19 ENCOUNTER — TRANSFERRED RECORDS (OUTPATIENT)
Dept: HEALTH INFORMATION MANAGEMENT | Facility: CLINIC | Age: 26
End: 2021-10-19

## 2021-11-18 ENCOUNTER — PRE VISIT (OUTPATIENT)
Dept: ENDOCRINOLOGY | Facility: CLINIC | Age: 26
End: 2021-11-18

## 2021-11-18 ENCOUNTER — VIRTUAL VISIT (OUTPATIENT)
Dept: ENDOCRINOLOGY | Facility: CLINIC | Age: 26
End: 2021-11-18
Payer: COMMERCIAL

## 2021-11-18 DIAGNOSIS — Z91.199 NO-SHOW FOR APPOINTMENT: Primary | ICD-10-CM

## 2021-11-18 PROCEDURE — 99207 PR NO CHARGE LOS: CPT | Performed by: NURSE PRACTITIONER

## 2021-11-18 NOTE — LETTER
11/18/2021       RE: Liz June  [address]  [place] [address]     Dear Colleague,    Thank you for referring your patient, Liz June, to the [facility] [place] at [facility]. Please see a copy of my visit note below.    Liz did not answer check in attempts x3. Left voicemail x3. Left callback number to reschedule.      Patient not seen. Anne Dupree CNP  [facility] [place]       Again, thank you for allowing me to participate in the care of your patient.      Sincerely,    Anne Dupree NP

## 2021-11-18 NOTE — LETTER
Date:December 6, 2021      Provider requested that no letter be sent. Do not send.       [facility]

## 2021-11-18 NOTE — PROGRESS NOTES
Patient not seen. Anne Dupree CNP  [facility] [place]

## 2021-11-29 ENCOUNTER — NURSE TRIAGE (OUTPATIENT)
Dept: FAMILY MEDICINE | Facility: CLINIC | Age: 26
End: 2021-11-29
Payer: COMMERCIAL

## 2021-11-29 NOTE — TELEPHONE ENCOUNTER
Consulted with Dr. Fairchild, covering provider for Dr. Nugent, who advised that patient utilize the [facility] for Adult Mental Health as well as the [facility] service.    Author called patient and informed them of the [facility]. Patient agreed to go and was heading out the door to head to mental health urgent care at call completion with author.    Patient asked author for address to the mental health urgent care and author provided it.    Author advised patient to call clinic back if they need more care and reiterated if things progressed to go to the emergency department.    Kelsie LUCAS RN

## 2021-11-29 NOTE — TELEPHONE ENCOUNTER
"Suicide attempt over 5 years ago. 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 Patient states that she is having delusions and hallucinations. Patient feels like they are \"in hell\" and that everyone around them is a demon. Patient states that they have thoughts that their partner is cheating on them.    Patient states that their partner is very supportive of them. Patient denies having an abusive relationship or home currently.     Patient feels like they are paranoid about everything in their daily life. This ultimately caused them to quit their job because they were so anxious and stressed about everyone around them.    Patient describes some of their visual hallucinations as large black spots or blocks that are in their peripheral vision continually.     Patient does not want to go to the ED because they do not want to get admitted. They would prefer to be seen by a primary care provider.  Patient states they only want to see Dr. Nugent.    Patient has attempted to reach out to a therapist who's hours were the same as her old working hours making it difficult for her to schedule.    Patient is comfortable speaking to a primary care provider as well as a therapist. Patient would like to come to [facility] and speak to someone and include partner, Jonathan, in their care.     Kelsie LUCAS RN      Reason for Disposition    [1] Schizophrenia AND [2] unable to do any of normal activities (e.g., self care, school, work; in comparison to baseline).    Additional Information    Negative: Patient attempted suicide    Negative: Hearing voices that are telling patient to commit suicide now    Negative: Patient is threatening suicide now    Negative: Violent behavior, or threatening to physically hurt or kill someone    Negative: Hearing voices that are telling patient to kill a specific person    " "Negative: [1] Patient is very confused (disoriented, slurred speech) AND [2] no other adult (e.g., friend or family member) available    Negative: [1] Difficult to awaken or acting very confused (disoriented, slurred speech) AND [2] new onset    Negative: Drug overdose suspected    Negative: Sounds like a life-threatening emergency to the triager    Negative: Alcohol use, abuse or dependence, question or problem related to    Negative: Drug abuse or dependence, question or problem related to    Answer Assessment - Initial Assessment Questions  1. CONCERN: \"What happened that made you call today?\"      Thoughts that they are being cheated on, they feel like they are in hell, and that everyone around them is a demon  2. SCHIZOPHRENIA SYMPTOM SCREENING: \"Have you been hearing voices (or seeing things) that others do not seem to hear (or see)? \"What are the voices telling you to do?\"      Yes, patient hears voices - patient is paranoid that they are hearing things others aren't  3. RISK OF HARM - SUICIDAL IDEATION:  \"Do you ever have thoughts of hurting or killing yourself?\"  (e.g., yes, no, no but preoccupation with thoughts about death)    - INTENT:  \"Do you have thoughts of hurting or killing yourself right NOW?\"   No active thoughts of hurting themselves right now.     - PLAN: \"Do you have a specific plan for how you would do this?\" (e.g., gun, knife, overdose, no plan, N/A)      Yes, has attempted in the past  4. RISK OF HARM - HOMICIDAL IDEATION:  \"Do you ever have thoughts of hurting or killing someone else?\"  (e.g., yes, no, no but preoccupation with thoughts about death)    - INTENT:  \"Do you have thoughts of hurting or killing someone right NOW?\" (e.g., yes, no, N/A)    - PLAN: \"Do you have a specific plan for how you would do this?\" (e.g., gun, knife, no plan, N/A)       None  5. FUNCTIONAL IMPAIRMENT: \"How have things been going for you overall in your life? Have you had any more difficulties than usual " "doing your normal daily activities?\"  (e.g., better, same, worse; self-care, school, work, interactions)      Things have been more difficult, been more stressed and more anxious, and has recently quit their job because they are having a hard time doing anything.  6. SUPPORT: \"Who is with you now?\" \"Who do you live with?\" \"Do you have family or friends nearby who you can talk to?\"       Lives with their partner, who just left to go to the bank  7. THERAPIST: \"Do you have a counselor or therapist? Name?\"      No  8. STRESSORS: \"Has there been any new stress or recent changes in your life?\"      Quit their job  9. DRUG ABUSE/ALCOHOL: \"Do you drink alcohol or use any illegal drugs?\"       Smoke marijuana, drinks occasionally on weekends  10. OTHER: \"Do you have any other health or medical symptoms right now?\" (e.g., fever)        No  11. PREGNANCY: \"Is there any chance you are pregnant?\" \"When was your last menstrual period?\"        Nope    Protocols used: SCHIZOPHRENIA-A-AH      "

## 2021-12-06 ENCOUNTER — MYC MEDICAL ADVICE (OUTPATIENT)
Dept: FAMILY MEDICINE | Facility: CLINIC | Age: 26
End: 2021-12-06
Payer: COMMERCIAL

## 2021-12-06 DIAGNOSIS — F33.1 MAJOR DEPRESSIVE DISORDER, RECURRENT EPISODE, MODERATE (H): ICD-10-CM

## 2021-12-06 DIAGNOSIS — F43.10 PTSD (POST-TRAUMATIC STRESS DISORDER): Primary | ICD-10-CM

## 2021-12-06 NOTE — TELEPHONE ENCOUNTER
Please fax psychiatry referral as detailed below     Susana Kuhn at [facility]. Phone # [phone]

## 2021-12-06 NOTE — TELEPHONE ENCOUNTER
Fifi from [facility] states they just receive referral for patient. Caller states Susana Kuhn only sees patient one time and then refer patient out. Caller wants to make patient is aware and confirm if patient still wants to proceed with scheduling.     TC left message for patient to call back. Please relay message and have patient call [facility] if she would like to proceed with scheduling.

## 2021-12-06 NOTE — TELEPHONE ENCOUNTER
Agree with plan for one-time referral, especially if this will get patient seen ASAP. She needs urgent psychiatric evaluation. Please have Fifi [facility] contact patient to facilitate appointment. Thanks.

## 2021-12-07 NOTE — TELEPHONE ENCOUNTER
Fifi called back, provider Susana Kuhn would see patient for a one time consult if the patient is an established and continued care at [facility], and then refer out to other psychiatry providers.Because patient is not an established patient at [facility], they have to wait for Susana Kuhn to respond if she would even see the patient. Fifi stated that Susana Kuhn sent a message to Dr. Nugent. Would provider have other recommendations to other organization for patient to receive services sooner? If any questions, please call Fifi at [phone].

## 2021-12-07 NOTE — TELEPHONE ENCOUNTER
"Patient called to schedule Intake with Dr Susana Kuhn. Getting conflicting information in regards to scheduling. Does patient have to \"establish care\" here at [facility] before she can be seen by Dr Kuhn?    Please follow up with patient, \"urgent psychiatric evaluation. Please have Fifi at [facility] contact patient to facilitate appointment. Thanks.\"   [phone]  "

## 2021-12-08 ENCOUNTER — MYC MEDICAL ADVICE (OUTPATIENT)
Dept: BEHAVIORAL HEALTH | Facility: CLINIC | Age: 26
End: 2021-12-08
Payer: COMMERCIAL

## 2021-12-08 RX ORDER — ONDANSETRON 4 MG/1
TABLET, FILM COATED ORAL
COMMUNITY
Start: 2021-08-25 | End: 2022-01-05

## 2021-12-08 RX ORDER — IBUPROFEN 200 MG
TABLET ORAL
COMMUNITY
Start: 2021-10-20 | End: 2022-01-05

## 2021-12-08 RX ORDER — METHYLPREDNISOLONE 4 MG
TABLET, DOSE PACK ORAL
COMMUNITY
Start: 2021-10-20 | End: 2022-01-05

## 2021-12-08 RX ORDER — DULOXETIN HYDROCHLORIDE 30 MG/1
CAPSULE, DELAYED RELEASE ORAL
COMMUNITY
Start: 2020-12-12 | End: 2022-01-05

## 2021-12-08 NOTE — TELEPHONE ENCOUNTER
RN spoke to patient (patient uses they/them pronouns) who stated that someone else had called and scheduled an appointment for next Thursday.   Patient and partner are under the impression that the group that was unable to take on patients care referred them on. That second group called patient and scheduled them for appointment 12/16.     RN inquired about current mood, patient states feeling 'fine'   Inquired about patient safety, patient replied that they are feeling safe  Patient partner was present at the time of call    RN noted that patient affect was flat but appropriate. Provided encouragement to call if needs present between now and appointment.     No further action needed at this time.     Routing to PCP for review.     Gerda Block RN on 12/8/2021 at 3:17 PM

## 2021-12-08 NOTE — TELEPHONE ENCOUNTER
"Based on patient mental health concerns- RN triage note stated the following on 11/29/21    \"Consulted with Dr. Fairchild, covering provider for Dr. Nugent, who advised that patient utilize the [facility] for Adult Mental Health as well as the [facility] service.     Author called patient and informed them of the [facility]. Patient agreed to go and was heading out the door to head to mental health urgent care at call completion with author.\"    There was a mychart message from patient then requesting a referral to Susana Kuhn- per the notes below, she is not able to take new patients, even as one-time consult.     Therefore, I can place a psychiatry referral; however, I do know the patient said she was concerned about cost for psychiatric care, which is why she was routed to [facility].     Patient may need to call [facility] to see what services are available, since the referral to Susana Kuhn, is not available.    Please call patient and inform her of this message- and have her reach out to [facility] for mental health or [facility] (in [facility])  "

## 2021-12-18 DIAGNOSIS — F33.2 SEVERE EPISODE OF RECURRENT MAJOR DEPRESSIVE DISORDER, WITHOUT PSYCHOTIC FEATURES (H): Primary | ICD-10-CM

## 2021-12-20 RX ORDER — ESCITALOPRAM OXALATE 10 MG/1
TABLET ORAL
Qty: 90 TABLET | Refills: 2 | Status: SHIPPED | OUTPATIENT
Start: 2021-12-20 | End: 2022-02-18

## 2021-12-20 NOTE — TELEPHONE ENCOUNTER
"Routing refill request to provider for review/approval because:  Drug interaction warning    Last Written Prescription Date:  1/13/2021  Last Fill Quantity: 90,  # refills: 3   Last office visit provider:  9/15/2021     Requested Prescriptions   Pending Prescriptions Disp Refills     escitalopram (LEXAPRO) 10 MG tablet [Pharmacy Med Name: ESCITALOPRAM 10MG TABLETS] 90 tablet 2     Sig: TAKE 1 TABLET(10 MG) BY MOUTH DAILY       SSRIs Protocol Passed - 12/18/2021 12:01 AM        Passed - Recent (12 mo) or future (30 days) visit within the authorizing provider's specialty     Patient has had an office visit with the authorizing provider or a provider within the authorizing providers department within the previous 12 mos or has a future within next 30 days. See \"Patient Info\" tab in inbasket, or \"Choose Columns\" in Meds & Orders section of the refill encounter.              Passed - Medication is active on med list        Passed - Patient is age 18 or older        Passed - No active pregnancy on record        Passed - No positive pregnancy test in last 12 months             Esperanza Field RN 12/20/21 3:10 PM  "

## 2022-01-05 ENCOUNTER — VIRTUAL VISIT (OUTPATIENT)
Dept: FAMILY MEDICINE | Facility: CLINIC | Age: 27
End: 2022-01-05
Payer: COMMERCIAL

## 2022-01-05 DIAGNOSIS — F33.1 MAJOR DEPRESSIVE DISORDER, RECURRENT EPISODE, MODERATE (H): ICD-10-CM

## 2022-01-05 DIAGNOSIS — K21.9 GASTROESOPHAGEAL REFLUX DISEASE WITHOUT ESOPHAGITIS: ICD-10-CM

## 2022-01-05 DIAGNOSIS — L71.0 PERIORAL DERMATITIS: Primary | ICD-10-CM

## 2022-01-05 PROCEDURE — 99213 OFFICE O/P EST LOW 20 MIN: CPT | Mod: 95 | Performed by: FAMILY MEDICINE

## 2022-01-05 RX ORDER — FAMOTIDINE 20 MG/1
20 TABLET, FILM COATED ORAL 2 TIMES DAILY
Qty: 60 TABLET | Refills: 2 | Status: SHIPPED | OUTPATIENT
Start: 2022-01-05 | End: 2022-04-07

## 2022-01-05 RX ORDER — CLINDAMYCIN PHOSPHATE 10 MG/G
GEL TOPICAL 2 TIMES DAILY
Qty: 60 G | Refills: 6 | Status: SHIPPED | OUTPATIENT
Start: 2022-01-05 | End: 2022-07-15

## 2022-01-05 NOTE — PROGRESS NOTES
"Liz is a 26 year old who is being evaluated via a billable telephone visit    Liz was seen today for derm problem and heartburn.    Diagnoses and all orders for this visit:    Perioral dermatitis: Not responsive to topical metronidazole. Reviewed options. Avoid use of topical steroid. Try topical antibiotic for 4-6 weeks- if no improvement, consider oral antibiotics for 4-6 weeks.  -     clindamycin (CLINDAMAX) 1 % external gel; Apply topically 2 times daily    Gastroesophageal reflux disease without esophagitis: Will try PPI with H2 blocker for 4 weeks. Check h pylori. If no improvement, would consider endoscopy or referral to GI.  -     famotidine (PEPCID) 20 MG tablet; Take 1 tablet (20 mg) by mouth 2 times daily  -     Helicobacter pylori Antigen Stool; Future    Major depressive disorder, recurrent episode, moderate (H)/Auditory and visual hallucinations: Has crisis counselor and meeting with weekly- she has virtual visit with psychiatry later today. She has supportive partner at home.      Subjective   Liz is a 26 year old who presents for the following health issues     Chief Complaint   Patient presents with     Derm Problem     Heartburn       HPI     Perioral dermatitis. Using metrocream for over 6 months- has not noticed any change. Sister- has used triamcinolone and wondering if this would help. Uses clean and clear face wash.    Heart burn: uses omeprazole before bed consistently. Still has 3-4 times per month waking up \"choking on acid\". Has tried pepto bismol. Used pepcid previously. Has some stomach pain \"stabbing\" when she is getting ready to have bowel movement. When she has loose stool, has such severe pain that she has emesis or feels like she is going to pass out. Occurs about 2 times per month. Left-lower abdominal pain.     Tinnitus. Had \"water damage to her ears\" over the years- was told by previous providers. No headache, balance issues.     Hallucinating for a few years. Been more " frequent lately. Visual and auditory. Become more disruptive. [facility]- signed a IMANI- virtual visit. Working with crisis counselor- weekly.      Review of Systems   Negative except as noted above in HPI      Objective           Vitals:  No vitals were obtained today due to virtual visit.    Physical Exam   Unable to assess- attempted video visit but unable to have working connection so changed to telephone visit          Kaylee Nugent MD

## 2022-01-11 ENCOUNTER — TELEPHONE (OUTPATIENT)
Dept: FAMILY MEDICINE | Facility: CLINIC | Age: 27
End: 2022-01-11
Payer: COMMERCIAL

## 2022-01-11 NOTE — TELEPHONE ENCOUNTER
Prior Authorization Request  Who's requesting:  Pharmacy   Pharmacy Name and Location: [facility] #[id number] [address] AT [address] [place] [address]  Medication Name: omeprazole (PRILOSEC) 20 MG DR capsule  Insurance Plan: [employer] [place]  Insurance Member ID Number:  [id number]  CoverMyMeds Key: N/A   Informed patient that prior authorizations can take up to 10 business days for response:   Yes  Okay to leave a detailed message: NO

## 2022-01-13 NOTE — TELEPHONE ENCOUNTER
Central Prior Authorization Team   Phone: [phone]    [place] Initiation    Medication: omeprazole  Insurance Company: [employer] [place] - Phone [phone] Fax [phone]  Pharmacy Filling the Rx: [facility] #[id number] [address] AT [address] [place] [address]  Filling Pharmacy Phone: [phone]  Filling Pharmacy Fax: [phone]  Start Date: 1/13/2022

## 2022-01-13 NOTE — TELEPHONE ENCOUNTER
PRIOR AUTHORIZATION DENIED    Medication: omeprazole-DENIED    Denial Date: 1/13/2022    Denial Rational: PATIENT DID NOT MEET CRITERIA -            Appeal Information:  IF YOU WOULD LIKE TO APPEAL PLEASE SUPPLY PA TEAM WITH A LETTER OF MEDICAL NECESSITY WITH CLINICAL REASON.

## 2022-01-17 DIAGNOSIS — F33.1 MAJOR DEPRESSIVE DISORDER, RECURRENT EPISODE, MODERATE (H): Primary | ICD-10-CM

## 2022-01-17 RX ORDER — ARIPIPRAZOLE 5 MG/1
5 TABLET ORAL DAILY
Qty: 30 TABLET | Refills: 0 | Status: SHIPPED | OUTPATIENT
Start: 2022-01-17

## 2022-02-14 DIAGNOSIS — Z76.0 ENCOUNTER FOR MEDICATION REFILL: Primary | ICD-10-CM

## 2022-02-14 DIAGNOSIS — F33.1 MAJOR DEPRESSIVE DISORDER, RECURRENT EPISODE, MODERATE (H): ICD-10-CM

## 2022-02-14 RX ORDER — ARIPIPRAZOLE 5 MG/1
TABLET ORAL
Qty: 90 TABLET | Refills: 3 | OUTPATIENT
Start: 2022-02-14

## 2022-02-14 NOTE — TELEPHONE ENCOUNTER
Declined refill for ability- patient received a one-time bridge dose to get her to her appointment with psychiatry at the beginning of February- psychiatrist should continue prescribing moving forward

## 2022-02-18 ENCOUNTER — MYC MEDICAL ADVICE (OUTPATIENT)
Dept: FAMILY MEDICINE | Facility: CLINIC | Age: 27
End: 2022-02-18
Payer: COMMERCIAL

## 2022-02-18 DIAGNOSIS — F33.2 SEVERE EPISODE OF RECURRENT MAJOR DEPRESSIVE DISORDER, WITHOUT PSYCHOTIC FEATURES (H): Primary | ICD-10-CM

## 2022-02-18 RX ORDER — ESCITALOPRAM OXALATE 20 MG/1
20 TABLET ORAL DAILY
Qty: 90 TABLET | Refills: 3 | Status: SHIPPED | OUTPATIENT
Start: 2022-02-18 | End: 2022-04-07

## 2022-02-18 NOTE — TELEPHONE ENCOUNTER
Note to MD Tavo DIALLO from behavioral health is scanned into chart so we may share information. What would MD like to send?     MD please review request to increase Lexapro to 20 mg.

## 2022-03-04 ENCOUNTER — MYC MEDICAL ADVICE (OUTPATIENT)
Dept: FAMILY MEDICINE | Facility: CLINIC | Age: 27
End: 2022-03-04
Payer: COMMERCIAL

## 2022-03-04 DIAGNOSIS — R61 GENERALIZED HYPERHIDROSIS: Primary | ICD-10-CM

## 2022-03-10 RX ORDER — GLYCOPYRROLATE 1 MG/1
1 TABLET ORAL DAILY
Qty: 30 TABLET | Refills: 0 | Status: SHIPPED | OUTPATIENT
Start: 2022-03-10 | End: 2022-04-11

## 2022-03-28 DIAGNOSIS — Z76.0 ENCOUNTER FOR MEDICATION REFILL: Primary | ICD-10-CM

## 2022-03-28 RX ORDER — TRAZODONE HYDROCHLORIDE 50 MG/1
50 TABLET, FILM COATED ORAL
Qty: 90 TABLET | Refills: 3 | Status: SHIPPED | OUTPATIENT
Start: 2022-03-28 | End: 2023-01-03

## 2022-03-28 NOTE — TELEPHONE ENCOUNTER
I have refilled trazodone 50 mg at bedtime- she was previously dosed 1-3 tablets as needed, but due to her being on lexapro and abilify, I would like her to only use the 50 mg dose to avoid drug interactions.

## 2022-03-31 ENCOUNTER — TRANSFERRED RECORDS (OUTPATIENT)
Dept: HEALTH INFORMATION MANAGEMENT | Facility: CLINIC | Age: 27
End: 2022-03-31

## 2022-04-07 ENCOUNTER — OFFICE VISIT (OUTPATIENT)
Dept: FAMILY MEDICINE | Facility: CLINIC | Age: 27
End: 2022-04-07
Payer: COMMERCIAL

## 2022-04-07 ENCOUNTER — MYC MEDICAL ADVICE (OUTPATIENT)
Dept: FAMILY MEDICINE | Facility: CLINIC | Age: 27
End: 2022-04-07

## 2022-04-07 VITALS
BODY MASS INDEX: 45.99 KG/M2 | WEIGHT: 293 LBS | HEART RATE: 106 BPM | DIASTOLIC BLOOD PRESSURE: 80 MMHG | OXYGEN SATURATION: 97 % | HEIGHT: 67 IN | SYSTOLIC BLOOD PRESSURE: 132 MMHG | RESPIRATION RATE: 16 BRPM | TEMPERATURE: 98.6 F

## 2022-04-07 DIAGNOSIS — L71.0 PERIORAL DERMATITIS: ICD-10-CM

## 2022-04-07 DIAGNOSIS — Z00.00 ROUTINE GENERAL MEDICAL EXAMINATION AT A HEALTH CARE FACILITY: Primary | ICD-10-CM

## 2022-04-07 DIAGNOSIS — Z76.0 ENCOUNTER FOR MEDICATION REFILL: Primary | ICD-10-CM

## 2022-04-07 DIAGNOSIS — R06.83 SNORING: ICD-10-CM

## 2022-04-07 DIAGNOSIS — Z11.3 SCREEN FOR STD (SEXUALLY TRANSMITTED DISEASE): ICD-10-CM

## 2022-04-07 DIAGNOSIS — Z12.4 CERVICAL CANCER SCREENING: ICD-10-CM

## 2022-04-07 DIAGNOSIS — F43.10 PTSD (POST-TRAUMATIC STRESS DISORDER): ICD-10-CM

## 2022-04-07 DIAGNOSIS — K21.9 GASTROESOPHAGEAL REFLUX DISEASE WITHOUT ESOPHAGITIS: ICD-10-CM

## 2022-04-07 DIAGNOSIS — E66.813 CLASS 3 SEVERE OBESITY WITH SERIOUS COMORBIDITY AND BODY MASS INDEX (BMI) OF 45.0 TO 49.9 IN ADULT, UNSPECIFIED OBESITY TYPE (H): ICD-10-CM

## 2022-04-07 DIAGNOSIS — Z11.59 NEED FOR HEPATITIS C SCREENING TEST: ICD-10-CM

## 2022-04-07 DIAGNOSIS — Z11.4 SCREENING FOR HIV (HUMAN IMMUNODEFICIENCY VIRUS): ICD-10-CM

## 2022-04-07 DIAGNOSIS — F33.3 SEVERE RECURRENT MAJOR DEPRESSIVE DISORDER WITH PSYCHOTIC FEATURES (H): ICD-10-CM

## 2022-04-07 DIAGNOSIS — I10 HYPERTENSION, UNSPECIFIED TYPE: ICD-10-CM

## 2022-04-07 DIAGNOSIS — R61 GENERALIZED HYPERHIDROSIS: ICD-10-CM

## 2022-04-07 DIAGNOSIS — E66.01 CLASS 3 SEVERE OBESITY WITH SERIOUS COMORBIDITY AND BODY MASS INDEX (BMI) OF 45.0 TO 49.9 IN ADULT, UNSPECIFIED OBESITY TYPE (H): ICD-10-CM

## 2022-04-07 LAB
CLUE CELLS: ABNORMAL
TRICHOMONAS, WET PREP: ABNORMAL
WBC'S/HIGH POWER FIELD, WET PREP: ABNORMAL
YEAST, WET PREP: ABNORMAL

## 2022-04-07 PROCEDURE — 99395 PREV VISIT EST AGE 18-39: CPT | Performed by: FAMILY MEDICINE

## 2022-04-07 PROCEDURE — 99214 OFFICE O/P EST MOD 30 MIN: CPT | Mod: 25 | Performed by: FAMILY MEDICINE

## 2022-04-07 PROCEDURE — 87591 N.GONORRHOEAE DNA AMP PROB: CPT | Performed by: FAMILY MEDICINE

## 2022-04-07 PROCEDURE — 87210 SMEAR WET MOUNT SALINE/INK: CPT | Performed by: FAMILY MEDICINE

## 2022-04-07 PROCEDURE — 87491 CHLMYD TRACH DNA AMP PROBE: CPT | Performed by: FAMILY MEDICINE

## 2022-04-07 PROCEDURE — 96127 BRIEF EMOTIONAL/BEHAV ASSMT: CPT | Performed by: FAMILY MEDICINE

## 2022-04-07 PROCEDURE — G0123 SCREEN CERV/VAG THIN LAYER: HCPCS | Performed by: FAMILY MEDICINE

## 2022-04-07 RX ORDER — LOSARTAN POTASSIUM 50 MG/1
50 TABLET ORAL DAILY
Qty: 90 TABLET | Refills: 3 | Status: SHIPPED | OUTPATIENT
Start: 2022-04-07 | End: 2023-06-12

## 2022-04-07 RX ORDER — ESCITALOPRAM OXALATE 20 MG/1
30 TABLET ORAL DAILY
COMMUNITY
End: 2023-01-03

## 2022-04-07 RX ORDER — FAMOTIDINE 20 MG/1
TABLET, FILM COATED ORAL
Qty: 180 TABLET | Refills: 3 | Status: SHIPPED | OUTPATIENT
Start: 2022-04-07 | End: 2023-05-05

## 2022-04-07 ASSESSMENT — ASTHMA QUESTIONNAIRES
ACT_TOTALSCORE: 17
QUESTION_5 LAST FOUR WEEKS HOW WOULD YOU RATE YOUR ASTHMA CONTROL: WELL CONTROLLED
ACT_TOTALSCORE: 17
QUESTION_4 LAST FOUR WEEKS HOW OFTEN HAVE YOU USED YOUR RESCUE INHALER OR NEBULIZER MEDICATION (SUCH AS ALBUTEROL): TWO OR THREE TIMES PER WEEK
QUESTION_3 LAST FOUR WEEKS HOW OFTEN DID YOUR ASTHMA SYMPTOMS (WHEEZING, COUGHING, SHORTNESS OF BREATH, CHEST TIGHTNESS OR PAIN) WAKE YOU UP AT NIGHT OR EARLIER THAN USUAL IN THE MORNING: NOT AT ALL
QUESTION_1 LAST FOUR WEEKS HOW MUCH OF THE TIME DID YOUR ASTHMA KEEP YOU FROM GETTING AS MUCH DONE AT WORK, SCHOOL OR AT HOME: SOME OF THE TIME
QUESTION_2 LAST FOUR WEEKS HOW OFTEN HAVE YOU HAD SHORTNESS OF BREATH: ONCE A DAY

## 2022-04-07 NOTE — PROGRESS NOTES
SUBJECTIVE:   CC: Liz June is an 26 year old woman who presents for preventive health visit.     Patient has been advised of split billing requirements and indicates understanding: Yes  Healthy Habits:     Getting at least 3 servings of Calcium per day:  NO    Bi-annual eye exam:  Yes    Dental care twice a year:  Yes    Sleep apnea or symptoms of sleep apnea:  Daytime drowsiness and Excessive snoring    Diet:  Regular (no restrictions)    Frequency of exercise:  None    Taking medications regularly:  Yes    Medication side effects:  None    PHQ-2 Total Score: 2    Additional concerns today:  Yes    Follows with psychiatrist, Dr. Dorothy Sy and has therapist. They have current diagnosis of MDD + psychotic features, but ruling out schizophrenia. They were also diagnosed with autism and undergoing assessment for ADHD. They continues to have auditory and visual hallucinations but much less interactive. Passive SI but declines specific plan or intent. They have supportive partner.    They reports R knee clicking.  Worse with going up and down stairs  Anterior knee  Achy  No injuries  Has been more sedentary recently and wonders if that is contributing to their symptoms    Started new job recently as  at [employer]        Today's PHQ-2 Score:   PHQ-2 ( 1999 [employer]) 4/7/2022   Q1: Little interest or pleasure in doing things 1   Q2: Feeling down, depressed or hopeless 1   PHQ-2 Score 2   Q1: Little interest or pleasure in doing things Several days   Q2: Feeling down, depressed or hopeless Several days   PHQ-2 Score 2       Abuse: Current or Past (Physical, Sexual or Emotional) - No  Do you feel safe in your environment? Yes    Have you ever done Advance Care Planning? (For example, a Health Directive, POLST, or a discussion with a medical provider or your loved ones about your wishes): No, advance care planning information given to patient to review.  Patient plans  "to discuss their wishes with loved ones or provider.      Social History     Tobacco Use     Smoking status: Former Smoker     Packs/day: 0.00     Years: 10.00     Pack years: 0.00     Types: Cigarettes     Quit date: 2016     Years since quittin.8     Smokeless tobacco: Never Used   Substance Use Topics     Alcohol use: Yes     If you drink alcohol do you typically have >3 drinks per day or >7 drinks per week? No    Alcohol Use 2022   Prescreen: >3 drinks/day or >7 drinks/week? No   Prescreen: >3 drinks/day or >7 drinks/week? -   No flowsheet data found.    Reviewed orders with patient.  Reviewed health maintenance and updated orders accordingly - Yes        Breast CA Risk Assessment (FHS-7) 2022   Do you have a family history of breast, colon, or ovarian cancer? No / Unknown     Reviewed and updated as needed this visit by clinical staff   Tobacco  Allergies  Meds              Reviewed and updated as needed this visit by Provider   Tobacco  Allergies  Meds  Problems  Med Hx  Surg Hx  Fam Hx               Review of Systems       OBJECTIVE:   /80   Pulse 106   Temp 98.6  F (37  C) (Oral)   Resp 16   Ht 1.689 m (5' 6.5\")   Wt 145.3 kg (320 lb 4 oz)   LMP 2022 (Exact Date)   SpO2 97%   Breastfeeding No   BMI 50.92 kg/m    Physical Exam  General: Alert, in NAD.  Eyes: PERRL, EOMI, sclera normal.  Breast: no masses  HENT: Normocephalic, no pharyngeal erythema, MMM.  Neck: Supple, no adenopathy.  Heart: Normal S1 and S2, regular rhythm. No murmurs, gallops, rubs.  Lungs: CTA bilaterally, no wheezes, no crackles, no rhonchi.  Psych: Normal mood and affect      ASSESSMENT/PLAN:     Liz was seen today for physical.    Diagnoses and all orders for this visit:    Routine general medical examination at a health care facility    Screening for HIV (human immunodeficiency virus)  -     HIV Antigen Antibody Combo; Future    Need for hepatitis C screening test  -     Hepatitis C " "Screen Reflex to HCV RNA Quant and Genotype; Future    Cervical cancer screening  -     Pap Screen reflex to HPV if ASCUS - recommend age 25 - 29; Future  -     Pap Screen reflex to HPV if ASCUS - recommend age 25 - 29    Class 3 severe obesity with serious comorbidity and body mass index (BMI) of 45.0 to 49.9 in adult, unspecified obesity type (H): Sleep study to assess for LYNNE.   -     Adult Sleep Eval & Management  Referral; Future    PTSD (post-traumatic stress disorder)/Severe recurrent major depressive disorder with psychotic features: follows with psychiatrist and therapist regularly. On medications.     Hypertension, unspecified type: Had recent labs. Vasovagal with blood draw. Continue losartan.    Perioral dermatitis: continue topical metronidazole    Generalized hyperhidrosis: Continue oral glycopyrrolate.  -     Adult Dermatology Referral; Future    Snoring: Referred for sleep study.  -     Adult Sleep Eval & Management  Referral; Future    Screen for STD (sexually transmitted disease): Asymptomatic screening.  -     Wet prep - Clinic Collect  -     Chlamydia trachomatis PCR - Clinic Collect  -     Neisseria gonorrhoeae PCR - Clinic Collect    Other orders  -     REVIEW OF HEALTH MAINTENANCE PROTOCOL ORDERS          COUNSELING:  Reviewed preventive health counseling, as reflected in patient instructions    Estimated body mass index is 50.92 kg/m  as calculated from the following:    Height as of this encounter: 1.689 m (5' 6.5\").    Weight as of this encounter: 145.3 kg (320 lb 4 oz).        Liz KAMARA Slade reports that Liz June quit smoking about 5 years ago. Liz ASADCallum Slade's smoking use included cigarettes. Liz June smoked 0.00 packs per day for 10.00 years. Liz RODRIGUEZ Slade has never used smokeless tobacco.      Counseling Resources:  ATP IV Guidelines  Pooled Cohorts Equation Calculator  Breast Cancer Risk Calculator  BRCA-Related Cancer Risk Assessment: " FHS-7 Tool  FRAX Risk Assessment  ICSI Preventive Guidelines  Dietary Guidelines for Americans, 2010  USDA's MyPlate  ASA Prophylaxis  Lung CA Screening    Kaylee Nugent MD  [facility]

## 2022-04-07 NOTE — LETTER
My Asthma Action Plan    Name: Liz June   YOB: 1995  Date: 4/11/2022   My doctor: Kaylee Nugent MD   My clinic: [facility]        My Control Medicine: Flovent  My Rescue Medicine: Albuterol (Proair/Ventolin/Proventil HFA) 2-4 puffs EVERY 4 HOURS as needed. Use a spacer if recommended by your provider.   My Asthma Severity:   Moderate Persistent  Know your asthma triggers: upper respiratory infections               GREEN ZONE   Good Control    I feel good    No cough or wheeze    Can work, sleep and play without asthma symptoms       Take your asthma control medicine every day.     1. If exercise triggers your asthma, take your rescue medication    15 minutes before exercise or sports, and    During exercise if you have asthma symptoms  2. Spacer to use with inhaler: If you have a spacer, make sure to use it with your inhaler             YELLOW ZONE Getting Worse  I have ANY of these:    I do not feel good    Cough or wheeze    Chest feels tight    Wake up at night   1. Keep taking your Green Zone medications  2. Start taking your rescue medicine:    every 20 minutes for up to 1 hour. Then every 4 hours for 24-48 hours.  3. If you stay in the Yellow Zone for more than 12-24 hours, contact your doctor.  4. If you do not return to the Green Zone in 12-24 hours or you get worse, start taking your oral steroid medicine if prescribed by your provider.           RED ZONE Medical Alert - Get Help  I have ANY of these:    I feel awful    Medicine is not helping    Breathing getting harder    Trouble walking or talking    Nose opens wide to breathe       1. Take your rescue medicine NOW  2. If your provider has prescribed an oral steroid medicine, start taking it NOW  3. Call your doctor NOW  4. If you are still in the Red Zone after 20 minutes and you have not reached your doctor:    Take your rescue medicine again and    Call 911 or go to the emergency room right away    See  your regular doctor within 2 weeks of an Emergency Room or Urgent Care visit for follow-up treatment.          Annual Reminders:  Meet with Asthma Educator,  Flu Shot in the Fall, consider Pneumonia Vaccination for patients with asthma (aged 19 and older).    Pharmacy: [id number] [address] AT [address] [place] [address]    Electronically signed by Kaylee Nugent MD   Date: 04/11/22                      Asthma Triggers  How To Control Things That Make Your Asthma Worse    Triggers are things that make your asthma worse.  Look at the list below to help you find your triggers and what you can do about them.  You can help prevent asthma flare-ups by staying away from your triggers.      Trigger                                                          What you can do   Cigarette Smoke  Tobacco smoke can make asthma worse. Do not allow smoking in your home, car or around you.  Be sure no one smokes at a child s day care or school.  If you smoke, ask your health care provider for ways to help you quit.  Ask family members to quit too.  Ask your health care provider for a referral to Quit Plan to help you quit smoking, or call 7-271-731-PLAN.     Colds, Flu, Bronchitis  These are common triggers of asthma. Wash your hands often.  Don t touch your eyes, nose or mouth.  Get a flu shot every year.     Dust Mites  These are tiny bugs that live in cloth or carpet. They are too small to see. Wash sheets and blankets in hot water every week.   Encase pillows and mattress in dust mite proof covers.  Avoid having carpet if you can. If you have carpet, vacuum weekly.   Use a dust mask and HEPA vacuum.   Pollen and Outdoor Mold  Some people are allergic to trees, grass, or weed pollen, or molds. Try to keep your windows closed.  Limit time out doors when pollen count is high.   Ask you health care provider about taking medicine during allergy season.     Animal Dander  Some people are  allergic to skin flakes, urine or saliva from pets with fur or feathers. Keep pets with fur or feathers out of your home.    If you can t keep the pet outdoors, then keep the pet out of your bedroom.  Keep the bedroom door closed.  Keep pets off cloth furniture and away from stuffed toys.     Mice, Rats, and Cockroaches   Some people are allergic to the waste from these pests.   Cover food and garbage.  Clean up spills and food crumbs.  Store grease in the refrigerator.   Keep food out of the bedroom.   Indoor Mold  This can be a trigger if your home has high moisture. Fix leaking faucets, pipes, or other sources of water.   Clean moldy surfaces.  Dehumidify basement if it is damp and smelly.   Smoke, Strong Odors, and Sprays  These can reduce air quality. Stay away from strong odors and sprays, such as perfume, powder, hair spray, paints, smoke incense, paint, cleaning products, candles and new carpet.   Exercise or Sports  Some people with asthma have this trigger. Be active!  Ask your doctor about taking medicine before sports or exercise to prevent symptoms.    Warm up for 5-10 minutes before and after sports or exercise.     Other Triggers of Asthma  Cold air:  Cover your nose and mouth with a scarf.  Sometimes laughing or crying can be a trigger.  Some medicines and food can trigger asthma.

## 2022-04-07 NOTE — TELEPHONE ENCOUNTER
Reason for Call:  Medication or medication refill:    Do you use a [facility]? no     Name of the pharmacy and phone number for the current request:  [facility] @ [phone]    Name of the medication requested: Losartan 50mg tabs take one daily     Last OV:  today    Can we leave a detailed message on this number? NO    Phone number patient can be reached at: Home number on file [phone] (home)    Best Time: any     Call taken on 4/7/2022 at 2:49 PM by Carlie Sethi CMA, CMT

## 2022-04-08 LAB
C TRACH DNA SPEC QL NAA+PROBE: NEGATIVE
N GONORRHOEA DNA SPEC QL NAA+PROBE: NEGATIVE

## 2022-04-08 NOTE — TELEPHONE ENCOUNTER
RN advised WBC not concerning -no other indications of infection at this time;     Gerda Block RN on 4/8/2022 at 8:14 AM

## 2022-04-10 DIAGNOSIS — Z76.0 ENCOUNTER FOR MEDICATION REFILL: Primary | ICD-10-CM

## 2022-04-10 DIAGNOSIS — R61 GENERALIZED HYPERHIDROSIS: ICD-10-CM

## 2022-04-11 LAB
BKR LAB AP GYN ADEQUACY: NORMAL
BKR LAB AP GYN INTERPRETATION: NORMAL
BKR LAB AP HPV REFLEX: NORMAL
BKR LAB AP PREVIOUS ABNORMAL: NORMAL
PATH REPORT.COMMENTS IMP SPEC: NORMAL
PATH REPORT.COMMENTS IMP SPEC: NORMAL
PATH REPORT.RELEVANT HX SPEC: NORMAL

## 2022-04-11 RX ORDER — GLYCOPYRROLATE 1 MG/1
TABLET ORAL
Qty: 90 TABLET | Refills: 3 | Status: SHIPPED | OUTPATIENT
Start: 2022-04-11 | End: 2022-12-16

## 2022-05-10 ENCOUNTER — MYC MEDICAL ADVICE (OUTPATIENT)
Dept: FAMILY MEDICINE | Facility: CLINIC | Age: 27
End: 2022-05-10
Payer: COMMERCIAL

## 2022-05-10 DIAGNOSIS — F33.1 MAJOR DEPRESSIVE DISORDER, RECURRENT EPISODE, MODERATE (H): Primary | ICD-10-CM

## 2022-05-11 NOTE — TELEPHONE ENCOUNTER
RN request clarification 1 (one) 50mg tab nightly or 150mg (3 tabs) nightly    Gerda Block RN on 5/11/2022 at 7:53 AM

## 2022-05-18 RX ORDER — TRAZODONE HYDROCHLORIDE 150 MG/1
150 TABLET ORAL AT BEDTIME
Qty: 15 TABLET | Refills: 0 | Status: SHIPPED | OUTPATIENT
Start: 2022-05-18 | End: 2022-05-24

## 2022-05-18 NOTE — CONFIDENTIAL NOTE
Will prescribe short term higher dose of trazodone while pcp is out of the office.   I am unfamiliar with patient and have not seen her in clinic so she will need to discuss work note with PCP.     Bárbara Shannon MD

## 2022-05-24 RX ORDER — TRAZODONE HYDROCHLORIDE 150 MG/1
150 TABLET ORAL AT BEDTIME
Qty: 30 TABLET | Refills: 0 | Status: SHIPPED | OUTPATIENT
Start: 2022-05-24

## 2022-05-30 DIAGNOSIS — F33.1 MAJOR DEPRESSIVE DISORDER, RECURRENT EPISODE, MODERATE (H): ICD-10-CM

## 2022-05-31 NOTE — TELEPHONE ENCOUNTER
"Routing refill request to provider for review/approval because:  Early refill requested.    Last Written Prescription Date:  5/24/22  Last Fill Quantity: 30,  # refills: 0   Last office visit provider:  4/7/22     Requested Prescriptions   Pending Prescriptions Disp Refills     traZODone (DESYREL) 150 MG tablet [Pharmacy Med Name: TRAZODONE 150MG (HUNDRED-FIFTY) TAB] 15 tablet      Sig: TAKE 1 TABLET(150 MG) BY MOUTH AT BEDTIME       Serotonin Modulators Passed - 5/31/2022  9:41 AM        Passed - Recent (12 mo) or future (30 days) visit within the authorizing provider's specialty     Patient has had an office visit with the authorizing provider or a provider within the authorizing providers department within the previous 12 mos or has a future within next 30 days. See \"Patient Info\" tab in inbasket, or \"Choose Columns\" in Meds & Orders section of the refill encounter.              Passed - Medication is active on med list        Passed - Patient is age 18 or older        Passed - No active pregnancy on record        Passed - No positive pregnancy test in past 12 months             Ambrosio Nelson RN 05/31/22 9:42 AM  "

## 2022-06-01 RX ORDER — TRAZODONE HYDROCHLORIDE 150 MG/1
TABLET ORAL
Qty: 90 TABLET | Refills: 3 | OUTPATIENT
Start: 2022-06-01

## 2022-06-01 NOTE — TELEPHONE ENCOUNTER
Already refilled for 1 month- patient's psychiatric provider to be prescribing this moving forward and patient had upcoming appointment already scheduled- see Monte Cristohart message.

## 2022-07-13 ENCOUNTER — TELEPHONE (OUTPATIENT)
Dept: DERMATOLOGY | Facility: CLINIC | Age: 27
End: 2022-07-13

## 2022-07-15 DIAGNOSIS — Z76.0 ENCOUNTER FOR MEDICATION REFILL: Primary | ICD-10-CM

## 2022-07-15 DIAGNOSIS — L71.0 PERIORAL DERMATITIS: ICD-10-CM

## 2022-07-15 RX ORDER — CLINDAMYCIN PHOSPHATE 10 MG/G
GEL TOPICAL
Qty: 60 G | Refills: 6 | Status: SHIPPED | OUTPATIENT
Start: 2022-07-15 | End: 2023-08-03

## 2022-07-15 NOTE — TELEPHONE ENCOUNTER
Using for Perioral dermatitis .     Medication refill queued and pended. Prescriber please review, sign, and send if appropriate. Thank you.

## 2022-07-29 ENCOUNTER — MYC MEDICAL ADVICE (OUTPATIENT)
Dept: FAMILY MEDICINE | Facility: CLINIC | Age: 27
End: 2022-07-29

## 2022-07-29 DIAGNOSIS — K64.9 HEMORRHOIDS, UNSPECIFIED HEMORRHOID TYPE: Primary | ICD-10-CM

## 2022-08-01 NOTE — TELEPHONE ENCOUNTER
[facility] [place]    [address] [place] [address]    [phone]   Yuval Paul MD    [address] [address]    [phone] (Work)    [phone] (Fax)

## 2022-09-03 ENCOUNTER — HEALTH MAINTENANCE LETTER (OUTPATIENT)
Age: 27
End: 2022-09-03

## 2022-09-17 ASSESSMENT — SLEEP AND FATIGUE QUESTIONNAIRES
HOW LIKELY ARE YOU TO NOD OFF OR FALL ASLEEP IN A CAR, WHILE STOPPED FOR A FEW MINUTES IN TRAFFIC: WOULD NEVER DOZE
HOW LIKELY ARE YOU TO NOD OFF OR FALL ASLEEP WHILE WATCHING TV: SLIGHT CHANCE OF DOZING
HOW LIKELY ARE YOU TO NOD OFF OR FALL ASLEEP WHILE SITTING QUIETLY AFTER LUNCH WITHOUT ALCOHOL: WOULD NEVER DOZE
HOW LIKELY ARE YOU TO NOD OFF OR FALL ASLEEP WHILE SITTING AND READING: WOULD NEVER DOZE
HOW LIKELY ARE YOU TO NOD OFF OR FALL ASLEEP WHILE SITTING AND TALKING TO SOMEONE: WOULD NEVER DOZE
HOW LIKELY ARE YOU TO NOD OFF OR FALL ASLEEP WHILE SITTING INACTIVE IN A PUBLIC PLACE: WOULD NEVER DOZE
HOW LIKELY ARE YOU TO NOD OFF OR FALL ASLEEP WHILE LYING DOWN TO REST IN THE AFTERNOON WHEN CIRCUMSTANCES PERMIT: HIGH CHANCE OF DOZING
HOW LIKELY ARE YOU TO NOD OFF OR FALL ASLEEP WHEN YOU ARE A PASSENGER IN A CAR FOR AN HOUR WITHOUT A BREAK: SLIGHT CHANCE OF DOZING

## 2022-09-20 ENCOUNTER — VIRTUAL VISIT (OUTPATIENT)
Dept: SLEEP MEDICINE | Facility: CLINIC | Age: 27
End: 2022-09-20
Attending: FAMILY MEDICINE
Payer: COMMERCIAL

## 2022-09-20 VITALS — HEIGHT: 67 IN | WEIGHT: 293 LBS | BODY MASS INDEX: 45.99 KG/M2

## 2022-09-20 DIAGNOSIS — E66.01 CLASS 3 SEVERE OBESITY WITH SERIOUS COMORBIDITY AND BODY MASS INDEX (BMI) OF 45.0 TO 49.9 IN ADULT, UNSPECIFIED OBESITY TYPE (H): ICD-10-CM

## 2022-09-20 DIAGNOSIS — G47.00 INSOMNIA, UNSPECIFIED TYPE: ICD-10-CM

## 2022-09-20 DIAGNOSIS — R06.83 SNORING: ICD-10-CM

## 2022-09-20 DIAGNOSIS — R06.81 WITNESSED APNEIC SPELLS: ICD-10-CM

## 2022-09-20 DIAGNOSIS — E66.813 CLASS 3 SEVERE OBESITY WITH SERIOUS COMORBIDITY AND BODY MASS INDEX (BMI) OF 45.0 TO 49.9 IN ADULT, UNSPECIFIED OBESITY TYPE (H): ICD-10-CM

## 2022-09-20 DIAGNOSIS — G47.9 SLEEP DISTURBANCE: Primary | ICD-10-CM

## 2022-09-20 PROCEDURE — 99203 OFFICE O/P NEW LOW 30 MIN: CPT | Mod: 95 | Performed by: NURSE PRACTITIONER

## 2022-09-20 ASSESSMENT — PAIN SCALES - GENERAL: PAINLEVEL: NO PAIN (0)
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
## 2022-09-20 NOTE — PATIENT INSTRUCTIONS
"      MY TREATMENT INFORMATION FOR SLEEP APNEA-  Liz June    DOCTOR : MATTIE Cervantes CNP    Am I having a sleep study at a sleep center?  --->Due to normal delays, you will be contacted within 2-4 weeks to schedule    Am I having a home sleep study?  --->Watch the video for the device you are using:    -/drop off device-   https://www.[employer].com/watch?v=yGGFBdELGhk    -Disposable device sent out require phone/computer application-   https://www.[employer].com/watch?v=BCce_vbiwxE      Frequently asked questions:  1. What is Obstructive Sleep Apnea (LYNNE)? LYNNE is the most common type of sleep apnea. Apnea means, \"without breath.\"  Apnea is most often caused by narrowing or collapse of the upper airway as muscles relax during sleep.   Almost everyone has occasional apneas. Most people with sleep apnea have had brief interruptions at night frequently for many years.  The severity of sleep apnea is related to how frequent and severe the events are.   2. What are the consequences of LYNNE? Symptoms include: feeling sleepy during the day, snoring loudly, gasping or stopping of breathing, trouble sleeping, and occasionally morning headaches or heartburn at night.  Sleepiness can be serious and even increase the risk of falling asleep while driving. Other health consequences may include development of high blood pressure and other cardiovascular disease in persons who are susceptible. Untreated LYNNE  can contribute to heart disease, stroke and diabetes.   3. What are the treatment options? In most situations, sleep apnea is a lifelong disease that must be managed with daily therapy. Medications are not effective for sleep apnea and surgery is generally not considered until other therapies have been tried. Your treatment is your choice . Continuous Positive Airway (CPAP) works right away and is the therapy that is effective in nearly everyone. An oral device to hold your jaw forward is usually the next most " reliable option. Other options include postioning devices (to keep you off your back), weight loss, and surgery including a tongue pacing device. There is more detail about some of these options below.  4. Are my sleep studies covered by insurance? Although we will request verification of coverage, we advise you also check in advance of the study to ensure there is coverage.    Important tips for those choosing CPAP and similar devices   Know your equipment:  CPAP is continuous positive airway pressure that prevents obstructive sleep apnea by keeping the throat from collapsing while you are sleeping. In most cases, the device is  smart  and can slowly self-adjusts if your throat collapses and keeps a record every day of how well you are treated-this information is available to you and your care team.  BPAP is bilevel positive airway pressure that keeps your throat open and also assists each breath with a pressure boost to maintain adequate breathing.  Special kinds of BPAP are used in patients who have inadequate breathing from lung or heart disease. In most cases, the device is  smart  and can slowly self-adjusts to assist breathing. Like CPAP, the device keeps a record of how well you are treated.  Your mask is your connection to the device. You get to choose what feels most comfortable and the staff will help to make sure if fits. Here: are some examples of the different masks that are available:       Key points to remember on your journey with sleep apnea:  Sleep study.  PAP devices often need to be adjusted during a sleep study to show that they are effective and adjusted right.  Good tips to remember: Try wearing just the mask during a quiet time during the day so your body adapts to wearing it. A humidifier is recommended for comfort in most cases to prevent drying of your nose and throat. Allergy medication from your provider may help you if you are having nasal congestion.  Getting settled-in. It takes  more than one night for most of us to get used to wearing a mask. Try wearing just the mask during a quiet time during the day so your body adapts to wearing it. A humidifier is recommended for comfort in most cases. Our team will work with you carefully on the first day and will be in contact within 4 days and again at 2 and 4 weeks for advice and remote device adjustments. Your therapy is evaluated by the device each day.   Use it every night. The more you are able to sleep naturally for 7-8 hours, the more likely you will have good sleep and to prevent health risks or symptoms from sleep apnea. Even if you use it 4 hours it helps. Occasionally all of us are unable to use a medical therapy, in sleep apnea, it is not dangerous to miss one night.   Communicate. Call our skilled team on the number provided on the first day if your visit for problems that make it difficult to wear the device. Over 2 out of 3 patients can learn to wear the device long-term with help from our team. Remember to call our team or your sleep providers if you are unable to wear the device as we may have other solutions for those who cannot adapt to mask CPAP therapy. It is recommended that you sleep your sleep provider within the first 3 months and yearly after that if you are not having problems.   Use it for your health. We encourage use of CPAP masks during daytime quiet periods to allow your face and brain to adapt to the sensation of CPAP so that it will be a more natural sensation to awaken to at night or during naps. This can be very useful during the first few weeks or months of adapting to CPAP though it does not help medically to wear CPAP during wakefulness and  should not be used as a strategy just to meet guidelines.  Take care of your equipment. Make sure you clean your mask and tubing using directions every day and that your filter and mask are replaced as recommended or if they are not working.     BESIDES CPAP, WHAT OTHER  THERAPIES ARE THERE?    Positioning Device  Positioning devices are generally used when sleep apnea is mild and only occurs on your back.This example shows a pillow that straps around the waist. It may be appropriate for those whose sleep study shows milder sleep apnea that occurs primarily when lying flat on one's back. Preliminary studies have shown benefit but effectiveness at home may need to be verified by a home sleep test. These devices are generally not covered by medical insurance.  Examples of devices that maintain sleeping on the back to prevent snoring and mild sleep apnea.    Belt type body positioner  http://[employer]/    Electronic reminder  http://nightshifttherapy.com/            Oral Appliance  What is oral appliance therapy?  An oral appliance device fits on your teeth at night like a retainer used after having braces. The device is made by a specialized dentist and requires several visits over 1-2 months before a manufactured device is made to fit your teeth and is adjusted to prevent your sleep apnea. Once an oral device is working properly, snoring should be improved. A home sleep test may be recommended at that time if to determine whether the sleep apnea is adequately treated.       Some things to remember:  -Oral devices are often, but not always, covered by your medical insurance. Be sure to check with your insurance provider.   -If you are referred for oral therapy, you will be given a list of specialized dentists to consider or you may choose to visit the Web site of the American Academy of Dental Sleep Medicine  -Oral devices are less likely to work if you have severe sleep apnea or are extremely overweight.     More detailed information  An oral appliance is a small acrylic device that fits over the upper and lower teeth  (similar to a retainer or a mouth guard). This device slightly moves jaw forward, which moves the base of the tongue forward, opens the airway, improves breathing  for effective treat snoring and obstructive sleep apnea in perhaps 7 out of 10 people .  The best working devices are custom-made by a dental device  after a mold is made of the teeth 1, 2, 3.  When is an oral appliance indicated?  Oral appliance therapy is recommended as a first-line treatment for patients with primary snoring, mild sleep apnea, and for patients with moderate sleep apnea who prefer appliance therapy to use of CPAP4, 5. Severity of sleep apnea is determined by sleep testing and is based on the number of respiratory events per hour of sleep.   How successful is oral appliance therapy?  The success rate of oral appliance therapy in patients with mild sleep apnea is 75-80% while in patients with moderate sleep apnea it is 50-70%. The chance of success in patients with severe sleep apnea is 40-50%. The research also shows that oral appliances have a beneficial effect on the cardiovascular health of LYNNE patients at the same magnitude as CPAP therapy7.  Oral appliances should be a second-line treatment in cases of severe sleep apnea, but if not completely successful then a combination therapy utilizing CPAP plus oral appliance therapy may be effective. Oral appliances tend to be effective in a broad range of patients although studies show that the patients who have the highest success are females, younger patients, those with milder disease, and less severe obesity. 3, 6.   Finding a dentist that practices dental sleep medicine  Specific training is available through the American Academy of Dental Sleep Medicine for dentists interested in working in the field of sleep. To find a dentist who is educated in the field of sleep and the use of oral appliances, near you, visit the Web site of the American Academy of Dental Sleep Medicine.    References  1. Campbell et al. Objectively measured vs self-reported compliance during oral appliance therapy for sleep-disordered breathing. Chest 2013;  144(5): 7245-8927.  2. Chandu et al. Objective measurement of compliance during oral appliance therapy for sleep-disordered breathing. Thorax 2013; 68(1): 91-96.  3. Yumiko et al. Mandibular advancement devices in 620 men and women with LYNNE and snoring: tolerability and predictors of treatment success. Chest 2004; 125: 0734-5188.  4. Britta, et al. Oral appliances for snoring and LYNNE: a review. Sleep 2006; 29: 244-262.  5. Omar et al. Oral appliance treatment for LYNNE: an update. J Clin Sleep Med 2014; 10(2): 215-227.  6. Marcelino et al. Predictors of OSAH treatment outcome. J Dent Res 2007; 86: 9744-5944.      Weight Loss:    Weight loss is a long-term strategy that may improve sleep apnea in some patients.    Weight management is a personal decision and the decision should be based on your interest and the potential benefits.  If you are interested in exploring weight loss strategies, the following discussion covers the impact on weight loss on sleep apnea and the approaches that may be successful.    Being overweight does not necessarily mean you will have health consequences.  Those who have BMI over 35 or over 27 with existing medical conditions carries greater risk.   Weight loss decreases severity of sleep apnea in most people with obesity. For those with mild obesity who have developed snoring with weight gain, even 15-30 pound weight loss can improve and occasionally eliminate sleep apnea.  Structured and life-long dietary and health habits are necessary to lose weight and keep healthier weight levels.     Though there may be significant health benefits from weight loss, long-term weight loss is very difficult to achieve- studies show success with dietary management in less than 10% of people. In addition, substantial weight loss may require years of dietary control and may be difficult if patients have severe obesity. In these cases, surgical management may be considered.  Finally, older  individuals who have tolerated obesity without health complications may be less likely to benefit from weight loss strategies.      Your BMI is Body mass index is 50.12 kg/m .    What is BMI?  Body mass index (BMI) is one way to tell whether you are at a healthy weight, overweight, or obese. It measures your weight in relation to your height.  A BMI of 18.5 to 24.9 is in the healthy range. A person with a BMI of 25 to 29.9 is considered overweight, and someone with a BMI of 30 or greater is considered obese.  Another way to find out if you are at risk for health problems caused by overweight and obesity is to measure your waist. If you are a woman and your waist is more than 35 inches, or if you are a man and your waist is more than 40 inches, your risk of disease may be higher.  More than two-thirds of American adults are considered overweight or obese. Being overweight or obese increases the risk for further weight gain.  Excess weight may lead to heart disease and diabetes. Creating and following plans for healthy eating and physical activity may help you improve your health.    Methods for maintaining or losing weight.  Weight control is part of healthy lifestyle and includes exercise, emotional health, and healthy eating habits.  Careful eating habits lifelong is the mainstay of weight control.  Though there are significant health benefits from weight loss, long-term weight loss with diet alone may be very difficult to achieve- studies show long-term success with dietary management in less than 10% of people. Attaining a healthy weight may be especially difficult to achieve in those with severe obesity. In some cases, medications, devices and surgical management might be considered.    What can you do?  If you are overweight or obese and are interested in methods for weight loss, you should discuss this with your provider. In addition, we recommend that you review healthy life styles and methods for weight loss  available through the National Institutes of Health patient information sites:   http://win.niddk.nih.gov/publications/index.htm    Surgery:    Surgery for obstructive sleep apnea is considered generally only when other therapies fail to work. Surgery may be discussed with you if you are having a difficult time tolerating CPAP and or when there is an abnormal structure that requires surgical correction.  Nose and throat surgeries often enlarge the airway to prevent collapse.  Most of these surgeries create pain for 1-2 weeks and up to half of the most common surgeries are not effective throughout life.  You should carefully discuss the benefits and drawbacks to surgery with your sleep provider and surgeon to determine if it is the best solution for you.   More information  Surgery for LYNNE is directed at areas that are responsible for narrowing or complete obstruction of the airway during sleep.  There are a wide range of procedures available to enlarge and/or stabilize the airway to prevent blockage of breathing in the three major areas where it can occur: the palate, tongue, and nasal regions.  Successful surgical treatment depends on the accurate identification of the factors responsible for obstructive sleep apnea in each person.  A personalized approach is required because there is no single treatment that works well for everyone.  Because of anatomic variation, consultation with an examination by a sleep surgeon is a critical first step in determining what surgical options are best for each patient.  In some cases, examination during sedation may be recommended in order to guide the selection of procedures.  Patients will be counseled about risks and benefits as well as the typical recovery course after surgery. Surgery is typically not a cure for a person s LYNNE.  However, surgery will often significantly improve one s LYNNE severity (termed  success rate ).  Even in the absence of a cure, surgery will decrease  the cardiovascular risk associated with OSA7; improve overall quality of life8 (sleepiness, functionality, sleep quality, etc).      Palate Procedures:  Patients with LYNNE often have narrowing of their airway in the region of their tonsils and uvula.  The goals of palate procedures are to widen the airway in this region as well as to help the tissues resist collapse.  Modern palate procedure techniques focus on tissue conservation and soft tissue rearrangement, rather than tissue removal.  Often the uvula is preserved in this procedure. Residual sleep apnea is common in patient after pharyngoplasty with an average reduction in sleep apnea events of 33%2.      Tongue Procedures:  ExamWhile patients are awake, the muscles that surround the throat are active and keep this region open for breathing. These muscles relax during sleep, allowing the tongue and other structures to collapse and block breathing.  There are several different tongue procedures available.  Selection of a tongue base procedure depends on characteristics seen on physical exam.  Generally, procedures are aimed at removing bulky tissues in this area or preventing the back of the tongue from falling back during sleep.  Success rates for tongue surgery range from 50-62%3.    Hypoglossal Nerve Stimulation:  Hypoglossal nerve stimulation has recently received approval from the [place] Food and Drug Administration for the treatment of obstructive sleep apnea.  This is based on research showing that the system was safe and effective in treating sleep apnea6.  Results showed that the median AHI score decreased 68%, from 29.3 to 9.0. This therapy uses an implant system that senses breathing patterns and delivers mild stimulation to airway muscles, which keeps the airway open during sleep.  The system consists of three fully implanted components: a small generator (similar in size to a pacemaker), a breathing sensor, and a stimulation lead.  Using a  small handheld remote, a patient turns the therapy on before bed and off upon awakening.    Candidates for this device must be greater than 22 years of age, have moderate to severe LYNNE (AHI between 20-65), BMI less than 32, have tried CPAP/oral appliance without success, and have appropriate upper airway anatomy (determined by a sleep endoscopy performed by Dr. Hart).    Hypoglossal Nerve Stimulation Pathway:    The sleep surgeon s office will work with the patient through the insurance prior-authorization process (including communications and appeals).    Nasal Procedures:  Nasal obstruction can interfere with nasal breathing during the day and night.  Studies have shown that relief of nasal obstruction can improve the ability of some patients to tolerate positive airway pressure therapy for obstructive sleep apnea1.  Treatment options include medications such as nasal saline, topical corticosteroid and antihistamine sprays, and oral medications such as antihistamines or decongestants. Non-surgical treatments can include external nasal dilators for selected patients. If these are not successful by themselves, surgery can improve the nasal airway either alone or in combination with these other options.      Combination Procedures:  Combination of surgical procedures and other treatments may be recommended, particularly if patients have more than one area of narrowing or persistent positional disease.  The success rate of combination surgery ranges from 66-80%2,3.    References  Jaime MA. The Role of the Nose in Snoring and Obstructive Sleep Apnoea: An Update.  Eur Arch Otorhinolaryngol. 2011; 268: 1365-73.   Marline SM; Camden JA; Tyshawn JR; Pallanch JF; Alec MB; Brenda SG; Lakesha MOSLEY. Surgical modifications of the upper airway for obstructive sleep apnea in adults: a systematic review and meta-analysis. SLEEP 2010;33(10):4684-2123. Windy DEMPSEY. Hypopharyngeal surgery in obstructive sleep apnea: an evidence-based  medicine review.  Arch Otolaryngol Head Neck Surg. 2006 Feb;132(2):206-13.  Demetrius YH1, Gus Y, Bienvenido SVETLANA. The efficacy of anatomically based multilevel surgery for obstructive sleep apnea. Otolaryngol Head Neck Surg. 2003 Oct;129(4):327-35.  Windy DEMPSEY, Goldberg A. Hypopharyngeal Surgery in Obstructive Sleep Apnea: An Evidence-Based Medicine Review. Arch Otolaryngol Head Neck Surg. 2006 Feb;132(2):206-13.  Gene MARIANO et al. Upper-Airway Stimulation for Obstructive Sleep Apnea.  N Engl J Med. 2014 Jan 9;370(2):139-49.  Kristen Y et al. Increased Incidence of Cardiovascular Disease in Middle-aged Men with Obstructive Sleep Apnea. Am J Respir Crit Care Med; 2002 166: 159-165  Cheemajess SHAVER et al. Studying Life Effects and Effectiveness of Palatopharyngoplasty (SLEEP) study: Subjective Outcomes of Isolated Uvulopalatopharyngoplasty. Otolaryngol Head Neck Surg. 2011; 144: 623-631.        WHAT IF I ONLY HAVE SNORING?    Mandibular advancement devices, lateral sleep positioning, long-term weight loss and treatment of nasal allergies have been shown to improve snoring.  Exercising tongue muscles with a game (Q-Layerttps://apps.Ameibo/us/renetta/soundly-reduce-snoring/wk8459444357) or stimulating the tongue during the day with a device (https://doi.org/10.3390/zcl06639494) have improved snoring in some individuals.    Remember to Drive Safe... Drive Alive     Sleep health profoundly affects your health, mood, and your safety.  Thirty three percent of the population (one in three of us) is not getting enough sleep and many have a sleep disorder. Not getting enough sleep or having an untreated / undertreated sleep condition may make us sleepy without even knowing it. In fact, our driving could be dramatically impaired due to our sleep health. As your provider, here are some things I would like you to know about driving:     Here are some warning signs for impairment and dangerous drowsy driving:              -Having been awake more than 16  hours               -Looking tired               -Eyelid drooping              -Head nodding (it could be too late at this point)              -Driving for more than 30 minutes     Some things you could do to make the driving safer if you are experiencing some drowsiness:              -Stop driving and rest              -Call for transportation              -Make sure your sleep disorder is adequately treated     Some things that have been shown NOT to work when experiencing drowsiness while driving:              -Turning on the radio              -Opening windows              -Eating any  distracting  /  entertaining  foods (e.g., sunflower seeds, candy, or any other)              -Talking on the phone      Your decision may not only impact your life, but also the life of others. Please, remember to drive safe for yourself and all of us.

## 2022-10-04 DIAGNOSIS — Z76.0 ENCOUNTER FOR MEDICATION REFILL: Primary | ICD-10-CM

## 2022-10-04 DIAGNOSIS — E55.9 VITAMIN D DEFICIENCY: ICD-10-CM

## 2022-10-08 DIAGNOSIS — J45.40 MODERATE PERSISTENT ASTHMA, UNSPECIFIED WHETHER COMPLICATED: ICD-10-CM

## 2022-10-09 RX ORDER — DEXAMETHASONE 4 MG/1
TABLET ORAL
Qty: 12 G | Refills: 11 | Status: SHIPPED | OUTPATIENT
Start: 2022-10-09 | End: 2023-11-03

## 2022-10-09 NOTE — TELEPHONE ENCOUNTER
"Routing refill request to provider for review/approval because:  Labs out of range:  ACT score  Labs not current:  ACT score    Last Written Prescription Date:  9/15/2021  Last Fill Quantity: 12 g ,  # refills: 11   Last office visit provider:  4/7/2022     Requested Prescriptions   Pending Prescriptions Disp Refills     FLOVENT  MCG/ACT inhaler [Pharmacy Med Name: FLOVENT HFA 110MCG ORAL INH 120INH] 12 g 11     Sig: INHALE 1 PUFF INTO THE LUNGS TWICE DAILY       Inhaled Steroids Protocol Failed - 10/9/2022  7:08 AM        Failed - Asthma control assessment score within normal limits in last 6 months     Please review ACT score.           Failed - Recent (6 mo) or future (30 days) visit within the authorizing provider's specialty     Patient had office visit in the last 6 months or has a visit in the next 30 days with authorizing provider or within the authorizing provider's specialty.  See \"Patient Info\" tab in inbasket, or \"Choose Columns\" in Meds & Orders section of the refill encounter.            Passed - Patient is age 12 or older        Passed - Medication is active on med list             Esperanza Field RN 10/09/22 7:09 AM  "

## 2022-12-09 ENCOUNTER — MYC MEDICAL ADVICE (OUTPATIENT)
Dept: FAMILY MEDICINE | Facility: CLINIC | Age: 27
End: 2022-12-09

## 2022-12-09 NOTE — LETTER
December 9, 2022      Liz June  [address] [address]        To Whom It May Concern:    Liz June is my patient.      Please excuse Liz June from work duties on 12/09/2022 due to illness.     Liz plans to return to work 12/12/2022.         Sincerely,        Kaylee Nugent MD

## 2022-12-09 NOTE — TELEPHONE ENCOUNTER
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 I queued the work note to excuse her for reasons of \"illness\". Please review and if you agree okay to sign as it is set to go to the patient's MyChart. Thank you!    Missed work today - 12/09/2022  Returning to work - 12/12/2022  "

## 2022-12-09 NOTE — LETTER
December 9, 2022      Liz June  [address] [facility] [address]        To Whom It May Concern:    Liz June is my patient.    Please excuse Liz June  until *** due to {WORK EXCUSE:940734}.        Sincerely,        Kaylee Nugent MD
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
## 2022-12-16 ENCOUNTER — OFFICE VISIT (OUTPATIENT)
Dept: DERMATOLOGY | Facility: CLINIC | Age: 27
End: 2022-12-16
Payer: COMMERCIAL

## 2022-12-16 DIAGNOSIS — L73.2 HIDRADENITIS SUPPURATIVA: ICD-10-CM

## 2022-12-16 DIAGNOSIS — Z76.0 ENCOUNTER FOR MEDICATION REFILL: ICD-10-CM

## 2022-12-16 DIAGNOSIS — L70.0 ACNE VULGARIS: Primary | ICD-10-CM

## 2022-12-16 DIAGNOSIS — D22.9 BENIGN NEVUS OF SKIN: ICD-10-CM

## 2022-12-16 DIAGNOSIS — R61 GENERALIZED HYPERHIDROSIS: ICD-10-CM

## 2022-12-16 PROCEDURE — 99204 OFFICE O/P NEW MOD 45 MIN: CPT | Performed by: PHYSICIAN ASSISTANT

## 2022-12-16 RX ORDER — DAPSONE 50 MG/G
GEL TOPICAL 2 TIMES DAILY
Qty: 90 G | Refills: 1 | Status: SHIPPED | OUTPATIENT
Start: 2022-12-16 | End: 2023-05-24

## 2022-12-16 RX ORDER — GLYCOPYRROLATE 1 MG/1
1 TABLET ORAL DAILY
Qty: 90 TABLET | Refills: 3 | Status: SHIPPED | OUTPATIENT
Start: 2022-12-16 | End: 2023-02-22

## 2022-12-16 RX ORDER — TRETINOIN 0.25 MG/G
CREAM TOPICAL
Qty: 45 G | Refills: 11 | Status: SHIPPED | OUTPATIENT
Start: 2022-12-16 | End: 2024-03-10

## 2022-12-16 ASSESSMENT — PAIN SCALES - GENERAL: PAINLEVEL: NO PAIN (0)
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
## 2022-12-16 NOTE — NURSING NOTE
Dermatology Rooming Note    Liz June's goals for this visit include:   Chief Complaint   Patient presents with     Derm Problem     Generalized hyperhidrosis- Siobhan states she wants to be seen for her acne, hyperhidrosis and cysts     Maddy Chester, Visit Facilitator

## 2022-12-20 ENCOUNTER — OFFICE VISIT (OUTPATIENT)
Dept: SLEEP MEDICINE | Facility: CLINIC | Age: 27
End: 2022-12-20
Attending: NURSE PRACTITIONER
Payer: COMMERCIAL

## 2022-12-20 DIAGNOSIS — E66.813 CLASS 3 SEVERE OBESITY WITH SERIOUS COMORBIDITY AND BODY MASS INDEX (BMI) OF 45.0 TO 49.9 IN ADULT, UNSPECIFIED OBESITY TYPE (H): ICD-10-CM

## 2022-12-20 DIAGNOSIS — G47.9 SLEEP DISTURBANCE: ICD-10-CM

## 2022-12-20 DIAGNOSIS — E66.01 CLASS 3 SEVERE OBESITY WITH SERIOUS COMORBIDITY AND BODY MASS INDEX (BMI) OF 45.0 TO 49.9 IN ADULT, UNSPECIFIED OBESITY TYPE (H): ICD-10-CM

## 2022-12-20 DIAGNOSIS — G47.00 INSOMNIA, UNSPECIFIED TYPE: ICD-10-CM

## 2022-12-20 DIAGNOSIS — R06.81 WITNESSED APNEIC SPELLS: ICD-10-CM

## 2022-12-20 DIAGNOSIS — R06.83 SNORING: ICD-10-CM

## 2022-12-20 PROCEDURE — G0399 HOME SLEEP TEST/TYPE 3 PORTA: HCPCS | Performed by: INTERNAL MEDICINE

## 2022-12-20 NOTE — PROGRESS NOTES
Pt is completing a home sleep test. Pt was instructed on how to put on the Noxturnal T3 device and associated equipment before going to bed and given the opportunity to practice putting it on before leaving the sleep center. Pt was reminded to bring the home sleep test kit back to the center tomorrow, at agreed upon time for download and reporting.   Cecily Ferrara CMA, HST Specialist  [facility]

## 2022-12-21 ENCOUNTER — DOCUMENTATION ONLY (OUTPATIENT)
Dept: SLEEP MEDICINE | Facility: CLINIC | Age: 27
End: 2022-12-21
Payer: COMMERCIAL

## 2022-12-21 NOTE — PROGRESS NOTES
Post sleep questionnaire not filled out.    This HSAT was performed using a Noxturnal T3 device which recorded snore, sound, movement activity, body position, nasal pressure, oronasal thermal airflow, pulse, oximetry and both chest and abdominal respiratory effort. HSAT data was restricted to the time patient states they were in bed.     HSAT was scored using 1B 4% hypopnea rule.     HST AHI (Non-PAT): 1.1  Snoring was reported as intermittent.  Time with SpO2 below 89% was 0.1 minutes.   Overall signal quality was good     Pt will follow up with sleep provider to determine appropriate therapy.

## 2022-12-22 NOTE — PROCEDURES
"HOME SLEEP STUDY INTERPRETATION    Patient: Liz June  MRN: [id number]  YOB: 1995  Study Date: 12/20/2022  Referring Provider: Celestina Nugent MD  Ordering Provider: Magnus Marshall DO     Indications for Home Study: Liz June is a 27 year old adult who presents with symptoms suggestive of obstructive sleep apnea.    Estimated body mass index is 50.12 kg/m  as calculated from the following:    Height as of 9/20/22: 1.702 m (5' 7\").    Weight as of 9/20/22: 145.2 kg (320 lb).  Total score - [place]: 5 (9/17/2022 11:00 AM)  Total Score: 6 (9/17/2022 11:01 AM)    Data: A full night home sleep study was performed recording the standard physiologic parameters including body position, movement, sound, nasal pressure, thermal oral airflow, chest and abdominal movements with respiratory inductance plethysmography, and oxygen saturation by pulse oximetry. Pulse rate was estimated by oximetry recording. This study was considered adequate based on > 4 hours of quality oximetry and respiratory recording. As specified by the AASM Manual for the Scoring of Sleep and Associated events, version 2.3, Rule VIII.D 1B, 4% oxygen desaturation scoring for hypopneas is used as a standard of care on all home sleep apnea testing.    Analysis Time:  539.5 minutes    Respiration:   Sleep Associated Hypoxemia: sustained hypoxemia was not present. Baseline oxygen saturation was 94%.  Time with saturation less than or equal to 88% was 0 minutes. The lowest oxygen saturation was 88%.   Snoring: Snoring was present.  Respiratory events: The home study revealed a presence of 1 obstructive apneas and 5 mixed and central apneas. There were 4 hypopneas resulting in a combined apnea/hypopnea index [AHI] of 1.1 events per hour.  AHI was 0.6 per hour supine, N/A per hour prone, 1.9 per hour on left side, and 1.1 per hour on right side.   Pattern: Excluding events noted above, respiratory rate and pattern was " Normal.    Position: Percent of time spent: supine - 34.8%, prone - 0%, on left - 23.3%, on right - 41.9%.    Heart Rate: By pulse oximetry normal rate was noted.     Assessment:   Patient did not rule in for obstructive sleep apnea.  Sleep associated hypoxemia was not present.    Recommendations:  Consider positional therapy.  Suggest optimizing sleep hygiene and avoiding sleep deprivation.  Weight management.    Diagnosis Code(s): Snoring R06.83    Magnus Marshall DO, December 21, 2022   Diplomate, American Board of Internal Medicine, Sleep Medicine

## 2022-12-23 ENCOUNTER — TELEPHONE (OUTPATIENT)
Dept: DERMATOLOGY | Facility: CLINIC | Age: 27
End: 2022-12-23

## 2022-12-27 ASSESSMENT — SLEEP AND FATIGUE QUESTIONNAIRES
HOW LIKELY ARE YOU TO NOD OFF OR FALL ASLEEP WHILE SITTING INACTIVE IN A PUBLIC PLACE: WOULD NEVER DOZE
HOW LIKELY ARE YOU TO NOD OFF OR FALL ASLEEP WHILE LYING DOWN TO REST IN THE AFTERNOON WHEN CIRCUMSTANCES PERMIT: HIGH CHANCE OF DOZING
HOW LIKELY ARE YOU TO NOD OFF OR FALL ASLEEP WHILE SITTING AND READING: SLIGHT CHANCE OF DOZING
HOW LIKELY ARE YOU TO NOD OFF OR FALL ASLEEP WHILE SITTING AND TALKING TO SOMEONE: WOULD NEVER DOZE
HOW LIKELY ARE YOU TO NOD OFF OR FALL ASLEEP WHILE WATCHING TV: MODERATE CHANCE OF DOZING
HOW LIKELY ARE YOU TO NOD OFF OR FALL ASLEEP IN A CAR, WHILE STOPPED FOR A FEW MINUTES IN TRAFFIC: WOULD NEVER DOZE
HOW LIKELY ARE YOU TO NOD OFF OR FALL ASLEEP WHEN YOU ARE A PASSENGER IN A CAR FOR AN HOUR WITHOUT A BREAK: SLIGHT CHANCE OF DOZING
HOW LIKELY ARE YOU TO NOD OFF OR FALL ASLEEP WHILE SITTING QUIETLY AFTER LUNCH WITHOUT ALCOHOL: SLIGHT CHANCE OF DOZING

## 2022-12-29 ENCOUNTER — TELEPHONE (OUTPATIENT)
Dept: FAMILY MEDICINE | Facility: CLINIC | Age: 27
End: 2022-12-29

## 2022-12-29 NOTE — TELEPHONE ENCOUNTER
Prior Authorization Retail Medication Request    Medication/Dose: Dapsone 5% gel  ICD code (if different than what is on RX):    Previously Tried and Failed:    Rationale:      Insurance Name:    Insurance ID:        Pharmacy Information (if different than what is on RX)  Name: [facility]   Phone:    Janice DARLING RN  [facility]  [phone]

## 2022-12-30 NOTE — TELEPHONE ENCOUNTER
Central Prior Authorization Team - Phone: [phone]     Prior Authorization Approval    Authorization Effective Date: 10/1/2022  Authorization Expiration Date: 12/30/2023  Medication: Dapsone 5% gel- [place] APPROVED  Approved Dose/Quantity: 1 tube of 60 per 30 days  Reference #:     Insurance Company:    Expected CoPay:       CoPay Card Available:      Foundation Assistance Needed:    Which Pharmacy is filling the prescription (Not needed for infusion/clinic administered): [id number] - [address] AT [address] [place] [address]  Pharmacy Notified: Yes  Patient Notified: YesComment:  pharmacy willnotify when ready

## 2022-12-30 NOTE — TELEPHONE ENCOUNTER
Central Prior Authorization Team - Phone: [phone]     Received fax request for chart notes on previously tried medications. Completed form and returned via fax. Waiting for determination.

## 2022-12-30 NOTE — TELEPHONE ENCOUNTER
Central Prior Authorization Team - Phone: [phone]     PA Initiation    Medication: Dapsone 5% gel- [place] INITIATED  Insurance Company:    Pharmacy Filling the Rx: [id number] [address] AT [address] [place] [address]  Filling Pharmacy Phone: [phone]  Filling Pharmacy Fax:    Start Date: 12/30/2022

## 2023-01-03 ENCOUNTER — VIRTUAL VISIT (OUTPATIENT)
Dept: SLEEP MEDICINE | Facility: CLINIC | Age: 28
End: 2023-01-03
Payer: COMMERCIAL

## 2023-01-03 VITALS — HEIGHT: 67 IN | WEIGHT: 293 LBS | BODY MASS INDEX: 45.99 KG/M2

## 2023-01-03 DIAGNOSIS — R06.83 SNORING: Primary | ICD-10-CM

## 2023-01-03 PROCEDURE — 99213 OFFICE O/P EST LOW 20 MIN: CPT | Mod: 95 | Performed by: NURSE PRACTITIONER

## 2023-01-03 NOTE — PATIENT INSTRUCTIONS
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
## 2023-01-29 ENCOUNTER — MYC MEDICAL ADVICE (OUTPATIENT)
Dept: FAMILY MEDICINE | Facility: CLINIC | Age: 28
End: 2023-01-29
Payer: COMMERCIAL

## 2023-01-30 ENCOUNTER — NURSE TRIAGE (OUTPATIENT)
Dept: FAMILY MEDICINE | Facility: CLINIC | Age: 28
End: 2023-01-30
Payer: COMMERCIAL

## 2023-01-30 NOTE — TELEPHONE ENCOUNTER
Will route this to RN triage- to triage this abdominal pain- I am unable to squeeze in this week unfortunately.

## 2023-01-30 NOTE — TELEPHONE ENCOUNTER
Please see previous nursing triage.  Patient agreed to seek urgent care at next onset of pain.  Denied pain today.  Patient unable to be seen in clinic other than Thursdays due to work schedule conflict.      RIDGE Johns, RN  [facility]

## 2023-01-30 NOTE — TELEPHONE ENCOUNTER
Patient's availability is on Thursdays.  Has work all other days.  No provider appointment. No pain today.  Is willing to travel to urgent care when having stomach pain again.  Urgent care in [place] provided.  Patient agreed.    Care advise - to see a provider in 1-3 days.    Assist patient for a medical appointment with a provider.  Patient unable to be seen when provider has open slots.  Offered urgent care.  Patient agreed.  See above.    RIDGE Johns, RN  [facility]      Reason for Disposition    Mild abdominal pain    Additional Information    Negative: Passed out (i.e., fainted, collapsed and was not responding)    Negative: Shock suspected (e.g., cold/pale/clammy skin, too weak to stand, low BP, rapid pulse)    Negative: Sounds like a life-threatening emergency to the triager    Negative: Chest pain    Negative: Pain is mainly in upper abdomen (if needed ask: 'is it mainly above the belly button?')    Negative: Abdominal pain and pregnant < 20 weeks    Negative: Abdominal pain and pregnant 20 or more weeks    Negative: SEVERE abdominal pain (e.g., excruciating)    Negative: Vomiting red blood or black (coffee ground) material    Negative: Bloody, black, or tarry bowel movements  (Exception: Chronic-unchanged black-grey bowel movements and is taking iron pills or Pepto-Bismol.)    Negative: Constant abdominal pain lasting > 2 hours    Negative: Vomiting bile (green color)    Negative: Patient sounds very sick or weak to the triager    Negative: Vomiting and abdomen looks much more swollen than usual    Negative: White of the eyes have turned yellow (i.e., jaundice)    Negative: Blood in urine (red, pink, or tea-colored)    Negative: Fever > 103 F (39.4 C)    Negative: Fever > 101 F (38.3 C) and over 60 years of age    Negative: Fever > 100.0 F (37.8 C) and has diabetes mellitus or a weak immune system (e.g., HIV positive, cancer chemotherapy, organ transplant, splenectomy,  "chronic steroids)    Negative: Fever > 100.0 F (37.8 C) and bedridden (e.g., nursing home patient, stroke, chronic illness, recovering from surgery)    Negative: Pregnant or could be pregnant (i.e., missed last menstrual period)    Negative: MODERATE pain (e.g., interferes with normal activities that comes and goes (cramps) lasts > 24 hours  (Exception: Pain with Vomiting or Diarrhea - see that Protocol.)    Negative: Unusual vaginal discharge    Negative: Age > 60 years    Negative: Patient wants to be seen    Negative: MILD pain (e.g., does not interfere with normal activities) and pain comes and goes (cramps) lasts > 48 hours  (Exception: This same abdominal pain is a chronic symptom recurrent or ongoing AND present > 4 weeks.)    Negative: Abdominal pain is a chronic symptom (recurrent or ongoing AND lasting > 4 weeks)    Negative: Pain with sexual intercourse (dyspareunia)    Answer Assessment - Initial Assessment Questions  1. LOCATION: \"Where does it hurt?\"       Center of stomach to chest  2. RADIATION: \"Does the pain shoot anywhere else?\" (e.g., chest, back)      From stomach to the chest  3. ONSET: \"When did the pain begin?\" (e.g., minutes, hours or days ago)       Pain began on Tuesday, last week, 1/24/2023.  Pain on two days, then better for another two days.  When pain does come, it lasts up to ten hours  4. SUDDEN: \"Gradual or sudden onset?\"      Sudden onset.  Pain wakes patient up at night when on.  5. PATTERN \"Does the pain come and go, or is it constant?\"     - If constant: \"Is it getting better, staying the same, or worsening?\"       (Note: Constant means the pain never goes away completely; most serious pain is constant and it progresses)      - If intermittent: \"How long does it last?\" \"Do you have pain now?\"      (Note: Intermittent means the pain goes away completely between bouts)      Pain comes and goes.  No stomach pain today.   6. SEVERITY: \"How bad is the pain?\"  (e.g., Scale 1-10; mild, " "moderate, or severe)    - MILD (1-3): doesn't interfere with normal activities, abdomen soft and not tender to touch     - MODERATE (4-7): interferes with normal activities or awakens from sleep, abdomen tender to touch     - SEVERE (8-10): excruciating pain, doubled over, unable to do any normal activities        Rated pain on 5/10 when having pain.   7. RECURRENT SYMPTOM: \"Have you ever had this type of stomach pain before?\" If Yes, ask: \"When was the last time?\" and \"What happened that time?\"       No.  New symptoms.  8. CAUSE: \"What do you think is causing the stomach pain?\"      Uncertain if it's food related to have caused indigestion or intestinal track issue.   9. RELIEVING/AGGRAVATING FACTORS: \"What makes it better or worse?\" (e.g., movement, antacids, bowel movement)      Nothing makes it better/worse.  No improvement with bowel movement or after threw up.   10. OTHER SYMPTOMS: \"Do you have any other symptoms?\" (e.g., back pain, diarrhea, fever, urination pain, vomiting)        Vomiting.  Denied other symptoms.   11. PREGNANCY: \"Is there any chance you are pregnant?\" \"When was your last menstrual period?\"     Not pregnant.  LMP: 12/20/2022.  This is common.  Menstrual cycle occurs every other month.    Protocols used: ABDOMINAL PAIN - FEMALE-A-OH    "

## 2023-02-22 DIAGNOSIS — R61 GENERALIZED HYPERHIDROSIS: ICD-10-CM

## 2023-02-22 DIAGNOSIS — Z76.0 ENCOUNTER FOR MEDICATION REFILL: ICD-10-CM

## 2023-02-22 RX ORDER — GLYCOPYRROLATE 1 MG/1
1 TABLET ORAL DAILY
Qty: 90 TABLET | Refills: 3 | Status: SHIPPED | OUTPATIENT
Start: 2023-02-22 | End: 2023-12-19

## 2023-04-20 ENCOUNTER — PATIENT OUTREACH (OUTPATIENT)
Dept: CARE COORDINATION | Facility: CLINIC | Age: 28
End: 2023-04-20
Payer: COMMERCIAL

## 2023-05-04 DIAGNOSIS — Z76.0 ENCOUNTER FOR MEDICATION REFILL: ICD-10-CM

## 2023-05-04 DIAGNOSIS — K21.9 GASTROESOPHAGEAL REFLUX DISEASE WITHOUT ESOPHAGITIS: ICD-10-CM

## 2023-05-05 RX ORDER — FAMOTIDINE 20 MG/1
TABLET, FILM COATED ORAL
Qty: 180 TABLET | Refills: 3 | Status: SHIPPED | OUTPATIENT
Start: 2023-05-05 | End: 2024-03-22

## 2023-05-05 NOTE — TELEPHONE ENCOUNTER
"Routing refill request to provider for review/approval because:  Patient needs to be seen because it has been more than 1 year since last office visit.    Last Written Prescription Date:  4/7/22  Last Fill Quantity: 180,  # refills: 3   Last office visit provider:  4/7/22     Requested Prescriptions   Pending Prescriptions Disp Refills     famotidine (PEPCID) 20 MG tablet [Pharmacy Med Name: FAMOTIDINE 20MG TABLETS] 180 tablet 3     Sig: TAKE 1 TABLET(20 MG) BY MOUTH TWICE DAILY       H2 Blockers Protocol Failed - 5/4/2023 11:30 PM        Failed - Recent (12 mo) or future (30 days) visit within the authorizing provider's specialty     Patient has had an office visit with the authorizing provider or a provider within the authorizing providers department within the previous 12 mos or has a future within next 30 days. See \"Patient Info\" tab in inbasket, or \"Choose Columns\" in Meds & Orders section of the refill encounter.              Passed - Patient is age 12 or older        Passed - Medication is active on med list             Phyllis Guerra RN 05/04/23 11:30 PM  "

## 2023-05-21 DIAGNOSIS — L70.0 ACNE VULGARIS: ICD-10-CM

## 2023-05-24 RX ORDER — DAPSONE 50 MG/G
GEL TOPICAL 2 TIMES DAILY
Qty: 90 G | Refills: 2 | Status: SHIPPED | OUTPATIENT
Start: 2023-05-24 | End: 2024-03-27

## 2023-06-03 ENCOUNTER — HEALTH MAINTENANCE LETTER (OUTPATIENT)
Age: 28
End: 2023-06-03

## 2023-06-11 DIAGNOSIS — Z76.0 ENCOUNTER FOR MEDICATION REFILL: ICD-10-CM

## 2023-06-11 NOTE — TELEPHONE ENCOUNTER
"Routing refill request to provider for review/approval because:  Labs not current:  Creatinine and potassium  A break in medication  Patient needs to be seen because it has been more than 1 year since last office visit.  Blood pressures    Last Written Prescription Date:  4/7/2022  Last Fill Quantity: 90,  # refills: 3   Last office visit provider:  4/7/2022     Requested Prescriptions   Pending Prescriptions Disp Refills     losartan (COZAAR) 50 MG tablet [Pharmacy Med Name: LOSARTAN 50MG TABLETS] 90 tablet 3     Sig: TAKE 1 TABLET(50 MG) BY MOUTH DAILY       Angiotensin-II Receptors Failed - 6/11/2023  3:32 AM        Failed - Last blood pressure under 140/90 in past 12 months     BP Readings from Last 3 Encounters:   04/07/22 132/80   09/15/21 (!) 150/95   01/29/20 110/84                 Failed - Recent (12 mo) or future (30 days) visit within the authorizing provider's specialty     Patient has had an office visit with the authorizing provider or a provider within the authorizing providers department within the previous 12 mos or has a future within next 30 days. See \"Patient Info\" tab in inbasket, or \"Choose Columns\" in Meds & Orders section of the refill encounter.              Failed - Normal serum creatinine on file in past 12 months     Recent Labs   Lab Test 09/15/21  1719   CR 0.93       Ok to refill medication if creatinine is low          Failed - Normal serum potassium on file in past 12 months     Recent Labs   Lab Test 09/15/21  1719   POTASSIUM 4.1                    Passed - Medication is active on med list        Passed - Patient is age 18 or older        Passed - No active pregnancy on record        Passed - No positive pregnancy test in past 12 months             Vero Mitchell RN 06/11/23 4:18 PM  "

## 2023-06-12 RX ORDER — LOSARTAN POTASSIUM 50 MG/1
TABLET ORAL
Qty: 90 TABLET | Refills: 3 | Status: SHIPPED | OUTPATIENT
Start: 2023-06-12 | End: 2024-07-19

## 2023-07-27 ASSESSMENT — ENCOUNTER SYMPTOMS
SHORTNESS OF BREATH: 0
DIZZINESS: 0
HEMATURIA: 0
BREAST MASS: 0
CONSTIPATION: 0
ABDOMINAL PAIN: 0
MYALGIAS: 0
SORE THROAT: 0
HEADACHES: 0
DIARRHEA: 0
HEMATOCHEZIA: 0
NERVOUS/ANXIOUS: 0
FREQUENCY: 0
HEARTBURN: 0
CHILLS: 0
WEAKNESS: 0
COUGH: 0
NAUSEA: 0
FEVER: 0
EYE PAIN: 0
PARESTHESIAS: 0
DYSURIA: 0
PALPITATIONS: 0

## 2023-07-27 ASSESSMENT — ASTHMA QUESTIONNAIRES: ACT_TOTALSCORE: 24

## 2023-08-02 ASSESSMENT — PATIENT HEALTH QUESTIONNAIRE - PHQ9
SUM OF ALL RESPONSES TO PHQ QUESTIONS 1-9: 2
SUM OF ALL RESPONSES TO PHQ QUESTIONS 1-9: 2
10. IF YOU CHECKED OFF ANY PROBLEMS, HOW DIFFICULT HAVE THESE PROBLEMS MADE IT FOR YOU TO DO YOUR WORK, TAKE CARE OF THINGS AT HOME, OR GET ALONG WITH OTHER PEOPLE: NOT DIFFICULT AT ALL

## 2023-08-03 ENCOUNTER — OFFICE VISIT (OUTPATIENT)
Dept: FAMILY MEDICINE | Facility: CLINIC | Age: 28
End: 2023-08-03
Payer: COMMERCIAL

## 2023-08-03 VITALS
DIASTOLIC BLOOD PRESSURE: 80 MMHG | SYSTOLIC BLOOD PRESSURE: 122 MMHG | RESPIRATION RATE: 16 BRPM | WEIGHT: 293 LBS | OXYGEN SATURATION: 97 % | BODY MASS INDEX: 45.99 KG/M2 | TEMPERATURE: 98 F | HEART RATE: 81 BPM | HEIGHT: 67 IN

## 2023-08-03 DIAGNOSIS — Z13.1 SCREENING FOR DIABETES MELLITUS: ICD-10-CM

## 2023-08-03 DIAGNOSIS — I10 HYPERTENSION, UNSPECIFIED TYPE: ICD-10-CM

## 2023-08-03 DIAGNOSIS — Z11.4 SCREENING FOR HIV (HUMAN IMMUNODEFICIENCY VIRUS): ICD-10-CM

## 2023-08-03 DIAGNOSIS — Z00.00 ROUTINE GENERAL MEDICAL EXAMINATION AT A HEALTH CARE FACILITY: Primary | ICD-10-CM

## 2023-08-03 DIAGNOSIS — Z11.59 NEED FOR HEPATITIS C SCREENING TEST: ICD-10-CM

## 2023-08-03 DIAGNOSIS — Z13.220 LIPID SCREENING: ICD-10-CM

## 2023-08-03 DIAGNOSIS — F33.3 SEVERE RECURRENT MAJOR DEPRESSIVE DISORDER WITH PSYCHOTIC FEATURES (H): ICD-10-CM

## 2023-08-03 DIAGNOSIS — R61 GENERALIZED HYPERHIDROSIS: ICD-10-CM

## 2023-08-03 PROCEDURE — 99395 PREV VISIT EST AGE 18-39: CPT | Performed by: FAMILY MEDICINE

## 2023-08-03 ASSESSMENT — ENCOUNTER SYMPTOMS
CHILLS: 0
DIARRHEA: 0
SORE THROAT: 0
FREQUENCY: 0
EYE PAIN: 0
FEVER: 0
CONSTIPATION: 0
NERVOUS/ANXIOUS: 0
DYSURIA: 0
DIZZINESS: 0
PALPITATIONS: 0
MYALGIAS: 0
NAUSEA: 0
WEAKNESS: 0
ABDOMINAL PAIN: 0
COUGH: 0
HEADACHES: 0
SHORTNESS OF BREATH: 0
HEMATURIA: 0

## 2023-08-03 NOTE — PROGRESS NOTES
SUBJECTIVE:   CC: Liz is an 28 year old who presents for preventive health visit.       8/3/2023    12:22 PM   Additional Questions   Roomed by dane restrepo   Accompanied by Self       Healthy Habits:     Getting at least 3 servings of Calcium per day:  NO    Bi-annual eye exam:  NO    Dental care twice a year:  Yes    Sleep apnea or symptoms of sleep apnea:  None    Diet:  Regular (no restrictions)    Frequency of exercise:  None    Taking medications regularly:  Yes    Medication side effects:  None    Additional concerns today:  Yes    Interested in discussing weight management medications  Her partner is on phentermine and she is wondering if this would be option  Her sister is on ozempic    She has periods ~2 months, last 3 days.     She follows with psychiatrist  Mental health improved since being on medications  She was previously seeing a therapist but stopped after her therapist left- she has been coping well  Hallucinations have resolved    Today's PHQ-9 Score:       2023     1:37 PM   PHQ-9 SCORE   PHQ-9 Total Score MyChart 2 (Minimal depression)   PHQ-9 Total Score 2       Follows with psychiatrist for medication management  Mood significantly improved  Working 3 days a week- uses the other 2 days as mental health days        Social History     Tobacco Use    Smoking status: Some Days     Packs/day: 0.00     Years: 10.00     Pack years: 0.00     Types: Cigarettes     Last attempt to quit: 2016     Years since quittin.1     Passive exposure: Current    Smokeless tobacco: Never   Substance Use Topics    Alcohol use: Yes             2023     8:04 PM   Alcohol Use   Prescreen: >3 drinks/day or >7 drinks/week? No     Reviewed orders with patient.  Reviewed health maintenance and updated orders accordingly - Yes    Breast Cancer Screenin/7/2022     9:18 AM   Breast CA Risk Assessment (FHS-7)   Do you have a family history of breast, colon, or ovarian cancer? No / Unknown  "          Pertinent mammograms are reviewed under the imaging tab.    History of abnormal Pap smear:       4/7/2022    10:25 AM   PAP / HPV   PAP Negative for Intraepithelial Lesion or Malignancy (NILM)      Reviewed and updated as needed this visit by clinical staff   Tobacco  Allergies  Meds              Reviewed and updated as needed this visit by Provider                   Review of Systems   Constitutional:  Negative for chills and fever.   HENT:  Negative for congestion, ear pain, hearing loss and sore throat.    Eyes:  Negative for pain and visual disturbance.   Respiratory:  Negative for cough and shortness of breath.    Cardiovascular:  Negative for chest pain and palpitations.   Gastrointestinal:  Negative for abdominal pain, constipation, diarrhea and nausea.   Genitourinary:  Negative for dysuria, frequency, genital sores, hematuria and urgency.   Musculoskeletal:  Negative for myalgias.   Skin:  Negative for rash.   Neurological:  Negative for dizziness, weakness and headaches.   Psychiatric/Behavioral:  The patient is not nervous/anxious.           OBJECTIVE:   /80 (BP Location: Left arm, Patient Position: Sitting, Cuff Size: Adult Large)   Pulse 81   Temp 98  F (36.7  C) (Oral)   Resp 16   Ht 1.7 m (5' 6.93\")   Wt 140.1 kg (308 lb 14.4 oz)   LMP 06/11/2023 (Exact Date)   SpO2 97%   BMI 48.48 kg/m    Physical Exam  General: Alert, no distress  Eyes: PERRL, EOMI, sclera normal.  HENT: Normocephalic, no pharyngeal erythema, MMM.  Neck: Supple, no adenopathy.  Heart: Normal S1 and S2, regular rhythm. No murmurs, gallops, rubs.  Lungs: CTA bilaterally, no wheezes, no crackles, no rhonchi.  Abdomen: Soft, non-tender, non-distended, BS+.   MSK: Normal ROM of upper and lower extremities.  Neuro: Alert and oriented x 3. Moves all extremities. No focal deficits noted.  Extremities: Peripheral pulses intact, no peripheral edema.  Skin: Warm and well perfused, no rashes noted.  Psych: Normal mood " "and affect.      ASSESSMENT/PLAN:     Liz was seen today for physical.    Diagnoses and all orders for this visit:    Liz was seen today for physical.    Diagnoses and all orders for this visit:    Routine general medical examination at a health care facility  -     Comprehensive metabolic panel (BMP + Alb, Alk Phos, ALT, AST, Total. Bili, TP); Future  -     CBC with platelets; Future    Screening for HIV (human immunodeficiency virus)  -     HIV Antigen Antibody Combo; Future    Need for hepatitis C screening test  -     Hepatitis C Screen Reflex to HCV RNA Quant and Genotype; Future    Lipid screening  -     Lipid panel reflex to direct LDL Fasting; Future    Screening for diabetes mellitus  -     Hemoglobin A1c; Future    BMI 45.0-49.9, adult (H): Reviewed options for treatment. Patient will look into her insurance coverage for GLP-1 agonist.  -     Adult Comprehensive Weight Management  Referral; Future    Severe recurrent major depressive disorder with psychotic features (H): Improved- follows with psychiatrist for medication management.     Hypertension, unspecified type: BP at goal- continue losartan.     Generalized hyperhidrosis: Uses robinul.            COUNSELING:  Reviewed preventive health counseling, as reflected in patient instructions      BMI:   Estimated body mass index is 48.48 kg/m  as calculated from the following:    Height as of this encounter: 1.7 m (5' 6.93\").    Weight as of this encounter: 140.1 kg (308 lb 14.4 oz).       Liz M Slade reports that Liz ASADCallum Slade has been smoking cigarettes. Liz MICHAEL June has been exposed to tobacco smoke. Liz MICHAEL June has never used smokeless tobacco.        Kaylee Nugent MD  [facility]  "

## 2023-08-13 ENCOUNTER — TRANSFERRED RECORDS (OUTPATIENT)
Dept: HEALTH INFORMATION MANAGEMENT | Facility: CLINIC | Age: 28
End: 2023-08-13
Payer: COMMERCIAL

## 2023-08-14 ENCOUNTER — MYC MEDICAL ADVICE (OUTPATIENT)
Dept: FAMILY MEDICINE | Facility: CLINIC | Age: 28
End: 2023-08-14
Payer: COMMERCIAL

## 2023-09-18 ENCOUNTER — MYC MEDICAL ADVICE (OUTPATIENT)
Dept: FAMILY MEDICINE | Facility: CLINIC | Age: 28
End: 2023-09-18
Payer: COMMERCIAL

## 2023-09-18 DIAGNOSIS — K21.9 GASTROESOPHAGEAL REFLUX DISEASE WITHOUT ESOPHAGITIS: ICD-10-CM

## 2023-09-18 DIAGNOSIS — Z76.0 ENCOUNTER FOR MEDICATION REFILL: ICD-10-CM

## 2023-09-19 RX ORDER — FAMOTIDINE 40 MG/1
40 TABLET, FILM COATED ORAL 2 TIMES DAILY
Qty: 180 TABLET | Refills: 0 | Status: SHIPPED | OUTPATIENT
Start: 2023-09-19 | End: 2024-01-02

## 2023-09-19 NOTE — TELEPHONE ENCOUNTER
Patient would like a prescription for 40 mg BID via [employer].     Medication and pharmacy pending awaiting provider to review and approve.    RAJANI JohnsN, RN  [facility]

## 2023-10-14 DIAGNOSIS — Z76.0 ENCOUNTER FOR MEDICATION REFILL: ICD-10-CM

## 2023-10-14 DIAGNOSIS — E55.9 VITAMIN D DEFICIENCY: ICD-10-CM

## 2023-11-03 ENCOUNTER — MYC MEDICAL ADVICE (OUTPATIENT)
Dept: FAMILY MEDICINE | Facility: CLINIC | Age: 28
End: 2023-11-03
Payer: COMMERCIAL

## 2023-11-03 DIAGNOSIS — J45.40 MODERATE PERSISTENT ASTHMA, UNSPECIFIED WHETHER COMPLICATED: ICD-10-CM

## 2023-11-03 RX ORDER — FLUTICASONE PROPIONATE 110 UG/1
1 AEROSOL, METERED RESPIRATORY (INHALATION) 2 TIMES DAILY
Qty: 12 G | Refills: 4 | Status: SHIPPED | OUTPATIENT
Start: 2023-11-03 | End: 2024-03-06

## 2023-12-13 DIAGNOSIS — R61 GENERALIZED HYPERHIDROSIS: ICD-10-CM

## 2023-12-13 DIAGNOSIS — Z76.0 ENCOUNTER FOR MEDICATION REFILL: ICD-10-CM

## 2023-12-19 DIAGNOSIS — R61 GENERALIZED HYPERHIDROSIS: ICD-10-CM

## 2023-12-19 DIAGNOSIS — Z76.0 ENCOUNTER FOR MEDICATION REFILL: ICD-10-CM

## 2023-12-19 RX ORDER — GLYCOPYRROLATE 1 MG/1
TABLET ORAL
Qty: 90 TABLET | Refills: 0 | Status: SHIPPED | OUTPATIENT
Start: 2023-12-19 | End: 2024-01-02

## 2023-12-19 NOTE — TELEPHONE ENCOUNTER
12/16/2022  [facility] Janie Allan PA-C  Dermatology   RF process #4.  barbra refill sent to the pharmacy - including instructions for patient to call the clinic and schedule an appointment.

## 2023-12-22 NOTE — TELEPHONE ENCOUNTER
GLYCOPYRROLATE 1MG TABLETS   Pt is taking 2 Daily and needing a new updated order for 2 Mg's daily.    Please send new order for Pt care.        Thank you     Nichole Stone RN  Central Triage Red Flags/Med Refills

## 2023-12-30 DIAGNOSIS — K21.9 GASTROESOPHAGEAL REFLUX DISEASE WITHOUT ESOPHAGITIS: ICD-10-CM

## 2024-01-02 RX ORDER — GLYCOPYRROLATE 1 MG/1
2 TABLET ORAL DAILY
Qty: 180 TABLET | Refills: 2 | Status: SHIPPED | OUTPATIENT
Start: 2024-01-02

## 2024-01-02 RX ORDER — FAMOTIDINE 40 MG/1
40 TABLET, FILM COATED ORAL 2 TIMES DAILY
Qty: 180 TABLET | Refills: 2 | Status: SHIPPED | OUTPATIENT
Start: 2024-01-02

## 2024-01-04 ENCOUNTER — MYC MEDICAL ADVICE (OUTPATIENT)
Dept: FAMILY MEDICINE | Facility: CLINIC | Age: 29
End: 2024-01-04
Payer: COMMERCIAL

## 2024-01-04 DIAGNOSIS — F41.9 ANXIETY DUE TO INVASIVE PROCEDURE: Primary | ICD-10-CM

## 2024-01-04 NOTE — LETTER
January 4, 2024      Liz June  [address] [address] [address]        To Whom It May Concern:    Please excuse Liz June 1/4/24, 1/5/24 and 1/8/24- okay to return to work on 1/9/24.         Sincerely,        Kaylee Nugent MD  Electronically signed 1/4/24 at 2:20PM    Kaylee Nugent MD

## 2024-01-04 NOTE — LETTER
January 4, 2024      Liz June  [address] [address]        To Whom It May Concern:    Please excuse Liz June 1/4/24, 1/5/24 and 1/7- okay to return to work on 1/8/24        Sincerely,        Kaylee Nugent MD

## 2024-01-11 RX ORDER — LORAZEPAM 0.5 MG/1
TABLET ORAL
Qty: 2 TABLET | Refills: 0 | Status: SHIPPED | OUTPATIENT
Start: 2024-01-11 | End: 2024-03-22

## 2024-01-11 NOTE — TELEPHONE ENCOUNTER
Dr. Nugent,  Please review patient MyChart message agreeing to try a benzodiazepine medication to manage symptoms. Advised patient to contact her psychiatrist to place needed lab via [facility] so she can get them done same time as other labs pending.      Thank you    Marco Antonio

## 2024-01-14 ENCOUNTER — TRANSFERRED RECORDS (OUTPATIENT)
Dept: HEALTH INFORMATION MANAGEMENT | Facility: CLINIC | Age: 29
End: 2024-01-14
Payer: COMMERCIAL

## 2024-01-24 ENCOUNTER — MYC MEDICAL ADVICE (OUTPATIENT)
Dept: FAMILY MEDICINE | Facility: CLINIC | Age: 29
End: 2024-01-24
Payer: COMMERCIAL

## 2024-01-25 NOTE — TELEPHONE ENCOUNTER
Responded to patient via MyChart, yes, room draw on exam bed can be done for upcoming lab only appointment. Confirmed with Me Thee.     Marco Antonio Toribio, RAJANIN, RN  [facility]

## 2024-02-12 ENCOUNTER — MYC MEDICAL ADVICE (OUTPATIENT)
Dept: FAMILY MEDICINE | Facility: CLINIC | Age: 29
End: 2024-02-12
Payer: COMMERCIAL

## 2024-02-20 ENCOUNTER — MYC MEDICAL ADVICE (OUTPATIENT)
Dept: FAMILY MEDICINE | Facility: CLINIC | Age: 29
End: 2024-02-20
Payer: COMMERCIAL

## 2024-02-20 NOTE — LETTER
February 21, 2024      Liz M Slade  [address] [facility] [address] [facility] [address]        To Whom It May Concern:    Please excuse Liz KAMARA Slade from work on 02/20/2024.     She may return to work on 02/21/2024.         Sincerely,        Kaylee Nugent MD LG/sugar

## 2024-02-23 ENCOUNTER — LAB (OUTPATIENT)
Dept: LAB | Facility: CLINIC | Age: 29
End: 2024-02-23
Payer: COMMERCIAL

## 2024-02-23 DIAGNOSIS — Z13.220 LIPID SCREENING: ICD-10-CM

## 2024-02-23 DIAGNOSIS — Z00.00 ROUTINE GENERAL MEDICAL EXAMINATION AT A HEALTH CARE FACILITY: ICD-10-CM

## 2024-02-23 DIAGNOSIS — Z79.899 ENCOUNTER FOR LONG-TERM (CURRENT) USE OF HIGH-RISK MEDICATION: ICD-10-CM

## 2024-02-23 DIAGNOSIS — Z11.4 SCREENING FOR HIV (HUMAN IMMUNODEFICIENCY VIRUS): ICD-10-CM

## 2024-02-23 DIAGNOSIS — Z13.1 SCREENING FOR DIABETES MELLITUS: ICD-10-CM

## 2024-02-23 DIAGNOSIS — Z11.59 NEED FOR HEPATITIS C SCREENING TEST: ICD-10-CM

## 2024-02-23 LAB
ALBUMIN SERPL BCG-MCNC: 4.2 G/DL (ref 3.5–5.2)
ALP SERPL-CCNC: 72 U/L (ref 40–150)
ALT SERPL W P-5'-P-CCNC: 21 U/L (ref 0–70)
ANION GAP SERPL CALCULATED.3IONS-SCNC: 8 MMOL/L (ref 7–15)
AST SERPL W P-5'-P-CCNC: 22 U/L (ref 0–45)
BASOPHILS # BLD AUTO: 0.1 10E3/UL (ref 0–0.2)
BASOPHILS NFR BLD AUTO: 1 %
BILIRUB SERPL-MCNC: 0.8 MG/DL
BUN SERPL-MCNC: 11.2 MG/DL (ref 6–20)
CALCIUM SERPL-MCNC: 9 MG/DL (ref 8.6–10)
CHLORIDE SERPL-SCNC: 102 MMOL/L (ref 98–107)
CHOLEST SERPL-MCNC: 208 MG/DL
CREAT SERPL-MCNC: 1.13 MG/DL (ref 0.51–1.17)
DEPRECATED HCO3 PLAS-SCNC: 27 MMOL/L (ref 22–29)
EGFRCR SERPLBLD CKD-EPI 2021: 67 ML/MIN/1.73M2
EOSINOPHIL # BLD AUTO: 1 10E3/UL (ref 0–0.7)
EOSINOPHIL NFR BLD AUTO: 15 %
ERYTHROCYTE [DISTWIDTH] IN BLOOD BY AUTOMATED COUNT: 12.7 % (ref 10–15)
FASTING STATUS PATIENT QL REPORTED: YES
FASTING STATUS PATIENT QL REPORTED: YES
GLUCOSE SERPL-MCNC: 95 MG/DL (ref 70–99)
GLUCOSE SERPL-MCNC: 95 MG/DL (ref 70–99)
HBA1C MFR BLD: 5.3 % (ref 0–5.6)
HCT VFR BLD AUTO: 38.9 % (ref 35–53)
HCV AB SERPL QL IA: NONREACTIVE
HDLC SERPL-MCNC: 44 MG/DL
HGB BLD-MCNC: 13.7 G/DL (ref 11.7–17.7)
HIV 1+2 AB+HIV1 P24 AG SERPL QL IA: NONREACTIVE
IMM GRANULOCYTES # BLD: 0 10E3/UL
IMM GRANULOCYTES NFR BLD: 0 %
LDLC SERPL CALC-MCNC: 135 MG/DL
LYMPHOCYTES # BLD AUTO: 2.4 10E3/UL (ref 0.8–5.3)
LYMPHOCYTES NFR BLD AUTO: 35 %
MCH RBC QN AUTO: 29.7 PG (ref 26.5–33)
MCHC RBC AUTO-ENTMCNC: 35.2 G/DL (ref 31.5–36.5)
MCV RBC AUTO: 84 FL (ref 78–100)
MONOCYTES # BLD AUTO: 0.4 10E3/UL (ref 0–1.3)
MONOCYTES NFR BLD AUTO: 5 %
NEUTROPHILS # BLD AUTO: 3 10E3/UL (ref 1.6–8.3)
NEUTROPHILS NFR BLD AUTO: 44 %
NONHDLC SERPL-MCNC: 164 MG/DL
PLATELET # BLD AUTO: 261 10E3/UL (ref 150–450)
POTASSIUM SERPL-SCNC: 4.5 MMOL/L (ref 3.4–5.3)
PROT SERPL-MCNC: 7.1 G/DL (ref 6.4–8.3)
RBC # BLD AUTO: 4.61 10E6/UL (ref 3.8–5.9)
SODIUM SERPL-SCNC: 137 MMOL/L (ref 135–145)
TRIGL SERPL-MCNC: 146 MG/DL
WBC # BLD AUTO: 6.9 10E3/UL (ref 4–11)

## 2024-02-23 PROCEDURE — 83036 HEMOGLOBIN GLYCOSYLATED A1C: CPT

## 2024-02-23 PROCEDURE — 87389 HIV-1 AG W/HIV-1&-2 AB AG IA: CPT

## 2024-02-23 PROCEDURE — 85025 COMPLETE CBC W/AUTO DIFF WBC: CPT

## 2024-02-23 PROCEDURE — 80053 COMPREHEN METABOLIC PANEL: CPT

## 2024-02-23 PROCEDURE — 86803 HEPATITIS C AB TEST: CPT

## 2024-02-23 PROCEDURE — 80061 LIPID PANEL: CPT

## 2024-02-23 PROCEDURE — 36415 COLL VENOUS BLD VENIPUNCTURE: CPT

## 2024-03-05 ENCOUNTER — MYC MEDICAL ADVICE (OUTPATIENT)
Dept: FAMILY MEDICINE | Facility: CLINIC | Age: 29
End: 2024-03-05
Payer: COMMERCIAL

## 2024-03-05 DIAGNOSIS — Z76.0 ENCOUNTER FOR MEDICATION REFILL: Primary | ICD-10-CM

## 2024-03-08 ENCOUNTER — MYC MEDICAL ADVICE (OUTPATIENT)
Dept: DERMATOLOGY | Facility: CLINIC | Age: 29
End: 2024-03-08
Payer: COMMERCIAL

## 2024-03-08 DIAGNOSIS — L70.0 ACNE VULGARIS: ICD-10-CM

## 2024-03-10 RX ORDER — TRETINOIN 0.25 MG/G
CREAM TOPICAL
Qty: 45 G | Refills: 11 | Status: SHIPPED | OUTPATIENT
Start: 2024-03-10 | End: 2024-03-27

## 2024-03-10 NOTE — TELEPHONE ENCOUNTER
12/16/2022 Assessment & Plan:     # Generalized hyperhidrosis   - Continue robinul 1-2mg daily, this is managing her well.      # Acne - previous on isotretinoin with good benefit  - start topical dapsone 7.5% gel BID  - hold clindamycin as this has not been helpful  - Start tretinoin 0.025% cream nightly  - cetpahil cleanser     # benign nevi - R humaira R hairline  - reassured benign     # HS - stable right now - inframammary folds, groin, axillae  - hibiclens is very helpful, far fewer flares now misael Hill is using this

## 2024-03-15 ASSESSMENT — ASTHMA QUESTIONNAIRES
ACT_TOTALSCORE: 24
ACT_TOTALSCORE: 24
QUESTION_2 LAST FOUR WEEKS HOW OFTEN HAVE YOU HAD SHORTNESS OF BREATH: ONCE OR TWICE A WEEK
QUESTION_3 LAST FOUR WEEKS HOW OFTEN DID YOUR ASTHMA SYMPTOMS (WHEEZING, COUGHING, SHORTNESS OF BREATH, CHEST TIGHTNESS OR PAIN) WAKE YOU UP AT NIGHT OR EARLIER THAN USUAL IN THE MORNING: NOT AT ALL
QUESTION_1 LAST FOUR WEEKS HOW MUCH OF THE TIME DID YOUR ASTHMA KEEP YOU FROM GETTING AS MUCH DONE AT WORK, SCHOOL OR AT HOME: NONE OF THE TIME
QUESTION_4 LAST FOUR WEEKS HOW OFTEN HAVE YOU USED YOUR RESCUE INHALER OR NEBULIZER MEDICATION (SUCH AS ALBUTEROL): NOT AT ALL
QUESTION_5 LAST FOUR WEEKS HOW WOULD YOU RATE YOUR ASTHMA CONTROL: COMPLETELY CONTROLLED

## 2024-03-21 ASSESSMENT — PATIENT HEALTH QUESTIONNAIRE - PHQ9
SUM OF ALL RESPONSES TO PHQ QUESTIONS 1-9: 12
10. IF YOU CHECKED OFF ANY PROBLEMS, HOW DIFFICULT HAVE THESE PROBLEMS MADE IT FOR YOU TO DO YOUR WORK, TAKE CARE OF THINGS AT HOME, OR GET ALONG WITH OTHER PEOPLE: VERY DIFFICULT
SUM OF ALL RESPONSES TO PHQ QUESTIONS 1-9: 12

## 2024-03-22 ENCOUNTER — OFFICE VISIT (OUTPATIENT)
Dept: FAMILY MEDICINE | Facility: CLINIC | Age: 29
End: 2024-03-22
Payer: COMMERCIAL

## 2024-03-22 VITALS
WEIGHT: 293 LBS | HEART RATE: 112 BPM | OXYGEN SATURATION: 98 % | RESPIRATION RATE: 20 BRPM | BODY MASS INDEX: 45.99 KG/M2 | HEIGHT: 67 IN | SYSTOLIC BLOOD PRESSURE: 126 MMHG | DIASTOLIC BLOOD PRESSURE: 88 MMHG | TEMPERATURE: 97.8 F

## 2024-03-22 DIAGNOSIS — N92.6 IRREGULAR PERIODS: Primary | ICD-10-CM

## 2024-03-22 DIAGNOSIS — N92.1 MENORRHAGIA WITH IRREGULAR CYCLE: ICD-10-CM

## 2024-03-22 DIAGNOSIS — N94.6 DYSMENORRHEA: ICD-10-CM

## 2024-03-22 DIAGNOSIS — E66.01 MORBID OBESITY (H): ICD-10-CM

## 2024-03-22 DIAGNOSIS — L73.2 HIDRADENITIS SUPPURATIVA: ICD-10-CM

## 2024-03-22 DIAGNOSIS — F33.1 MAJOR DEPRESSIVE DISORDER, RECURRENT EPISODE, MODERATE (H): ICD-10-CM

## 2024-03-22 DIAGNOSIS — E28.2 PCOS (POLYCYSTIC OVARIAN SYNDROME): ICD-10-CM

## 2024-03-22 PROCEDURE — 99214 OFFICE O/P EST MOD 30 MIN: CPT | Performed by: FAMILY MEDICINE

## 2024-03-22 RX ORDER — METFORMIN HCL 500 MG
500 TABLET, EXTENDED RELEASE 24 HR ORAL
Qty: 90 TABLET | Refills: 3 | Status: SHIPPED | OUTPATIENT
Start: 2024-03-22 | End: 2024-07-19

## 2024-03-22 NOTE — PROGRESS NOTES
Liz was seen today for pcos symptoms and hysterectomy consult .    Diagnoses and all orders for this visit:    Irregular periods  PCOS (polycystic ovarian syndrome): They meet diagnostic criteria with oligomenorrhea and clinical signs of excess androgen hormones. Declines blood work. Plan for pelvic US since they are having dysmenorrhea/menorrhagia as well. They were previously on IUD. Reviewed options for treatment, including lifestyle modifications, OCPs, metformin. They already have appointment with nutritionist- reviewed options for OCP vs metformin- plan to start low dose metformin. Reviewed side effect profile.  -     metFORMIN (GLUCOPHAGE XR) 500 MG 24 hr tablet; Take 1 tablet (500 mg) by mouth daily (with dinner)  -     US Pelvic Complete with Transvaginal; Future    Dysmenorrhea/Menorrhagia with irregular cycle: Interested in hysterectomy- not interested in having children in future. Plan pelvic US. They did have IUD previously. Refer to OB-GYN for further evaluation, treatment options.  -     Ob/Gyn  Referral; Future    Hidradenitis suppurativa  Acne: Follows with dermatologist.    Major depressive disorder, recurrent episode, moderate (H): Follows with mental health professional. Continue medication.    Morbid obesity (H)      Subjective   Liz is a 28 year old, presenting for the following health issues:  PCOS Symptoms (Would like to have some test done due to having some symptoms and also family history ) and Hysterectomy consult       3/22/2024    12:24 PM   Additional Questions   Roomed by Kaylee Gomez   Accompanied by Self     HPI     Irregular periods  Every 2 months on average  Excess hair on buttocks  Prabha removed 2019  +acne  +oily skin    Uses vaginal cup for menses  Heavy, painful periods  They are not interested in having children ever in future  Inquiring about hysterectomy    Has appointment upcoming for nutrition consult      Objective    /88 (BP Location: Right arm,  "Patient Position: Sitting, Cuff Size: Adult Large)   Pulse 112   Temp 97.8  F (36.6  C) (Oral)   Resp 20   Ht 1.7 m (5' 6.93\")   Wt 134.7 kg (297 lb)   LMP 02/14/2024 (Exact Date)   SpO2 98%   BMI 46.61 kg/m    Body mass index is 46.61 kg/m .  Physical Exam   Gen: well appearing, no distress        Signed Electronically by: Kaylee Nugent MD    "

## 2024-03-26 ENCOUNTER — MYC MEDICAL ADVICE (OUTPATIENT)
Dept: DERMATOLOGY | Facility: CLINIC | Age: 29
End: 2024-03-26
Payer: COMMERCIAL

## 2024-03-26 DIAGNOSIS — L70.0 ACNE VULGARIS: ICD-10-CM

## 2024-03-27 ENCOUNTER — MYC MEDICAL ADVICE (OUTPATIENT)
Dept: FAMILY MEDICINE | Facility: CLINIC | Age: 29
End: 2024-03-27
Payer: COMMERCIAL

## 2024-03-27 RX ORDER — DAPSONE 50 MG/G
GEL TOPICAL 2 TIMES DAILY
Qty: 90 G | Refills: 2 | Status: SHIPPED | OUTPATIENT
Start: 2024-03-27 | End: 2024-07-19

## 2024-03-27 RX ORDER — TRETINOIN 0.25 MG/G
CREAM TOPICAL
Qty: 45 G | Refills: 11 | Status: SHIPPED | OUTPATIENT
Start: 2024-03-27

## 2024-03-27 NOTE — LETTER
March 27, 2024      Liz M Mary  [address] [address]        To Whom It May Concern:    Please excuse Liz MICHAEL Hernandez from work duties on 03/27/2024 for reason of illness.     Liz may return to work on 03/28/2024.         Sincerely,         Kaylee Nugent MD LG/sugar     You can take compazine as needed every 6 hours for nausea. Continue to drink plenty of fluids. Consider stopping or cutting back on marijuana use as this may be causing your symptoms. Return to the Emergency Department if you develop uncontrolled vomiting, severe abdominal pain, fevers, unable to have a bowel movement, any worsening or concerning symptoms.

## 2024-04-01 ENCOUNTER — TELEPHONE (OUTPATIENT)
Dept: DERMATOLOGY | Facility: CLINIC | Age: 29
End: 2024-04-01
Payer: COMMERCIAL

## 2024-04-01 NOTE — TELEPHONE ENCOUNTER
Anne - Please advise if there is something you can do for the patient to help with the cost of the medication    John CHILEL CMA

## 2024-04-01 NOTE — TELEPHONE ENCOUNTER
Prior Authorization Retail Medication Request    Medication/Dose: Dapsone  Diagnosis and ICD code (if different than what is on RX):    Acne vulgaris [L70.0]        New/renewal/insurance change PA/secondary ins. PA:  Previously Tried and Failed:  See chart  Rationale:  has been working

## 2024-04-10 ENCOUNTER — MYC MEDICAL ADVICE (OUTPATIENT)
Dept: DERMATOLOGY | Facility: CLINIC | Age: 29
End: 2024-04-10
Payer: COMMERCIAL

## 2024-04-10 ENCOUNTER — HOSPITAL ENCOUNTER (OUTPATIENT)
Dept: ULTRASOUND IMAGING | Facility: HOSPITAL | Age: 29
Discharge: HOME | End: 2024-04-10
Attending: FAMILY MEDICINE | Admitting: FAMILY MEDICINE
Payer: COMMERCIAL

## 2024-04-10 DIAGNOSIS — N92.6 IRREGULAR PERIODS: ICD-10-CM

## 2024-04-10 PROCEDURE — 76830 TRANSVAGINAL US NON-OB: CPT

## 2024-04-12 NOTE — TELEPHONE ENCOUNTER
Retail Pharmacy Prior Authorization Team   Phone: [phone]    PA Initiation    Medication: DAPSONE 5 % EX GEL  Insurance Company: [employer] ([facility]) - Phone [phone] Fax [phone]  Pharmacy Filling the Rx: [id number] [address] AT [address] [place] [address]  Filling Pharmacy Phone: [phone]  Filling Pharmacy Fax:    Start Date: 4/12/2024

## 2024-04-12 NOTE — TELEPHONE ENCOUNTER
Note: Due to record-high volumes, our turn-around time is taking longer than usual . We are currently 10 business days behind in the pools.   We are working diligently to submit all requests in a timely manner and in the order they are received. Please only flag TRUE URGENT requests as high priority to the pool at this time.   If you have questions - please send a note/message in the active PA encounter and send back to the [facility] PA pool [[id number]].    If you have more specific questions about our process please reach out to our supervisor Cassidy Hernández.   Thank you!   RPPA (Retail Pharmacy Prior Authorization) team    We are currently submitting requests we received on 04/01/2024    PRIOR AUTHORIZATION DENIED    Medication: DAPSONE 5 % EX GEL  Insurance Company: [employer] ([facility]) - Phone [phone] Fax [phone]  Denial Date: 4/12/2024  Denial Rational:         Appeal Information:     If provider would like to appeal please review the plan's reasons for denial listed above. Please utilize that information to complete letter and provide specific, detailed clinical information/rationale of your patient's health status to address their denial reasons.      Patient Notified: No

## 2024-04-17 ENCOUNTER — MYC MEDICAL ADVICE (OUTPATIENT)
Dept: FAMILY MEDICINE | Facility: CLINIC | Age: 29
End: 2024-04-17
Payer: COMMERCIAL

## 2024-04-17 DIAGNOSIS — L70.0 ACNE VULGARIS: Primary | ICD-10-CM

## 2024-04-17 RX ORDER — ERYTHROMYCIN AND BENZOYL PEROXIDE 30; 50 MG/G; MG/G
GEL TOPICAL 2 TIMES DAILY
Qty: 46.6 G | Refills: 1 | Status: SHIPPED | OUTPATIENT
Start: 2024-04-17 | End: 2024-08-06

## 2024-04-18 NOTE — TELEPHONE ENCOUNTER
Janie Sims, SCAR  You14 hours ago (5:12 PM)     BB  I sent over an alternative - BPO/erythromycin for her to use BID on the skin for now. Hopefully this helps. It is not the same as dapsone, but it is at least a different antibiotic.    Kacey

## 2024-04-19 ENCOUNTER — TELEPHONE (OUTPATIENT)
Dept: ENDOCRINOLOGY | Facility: CLINIC | Age: 29
End: 2024-04-19
Payer: COMMERCIAL

## 2024-05-16 ENCOUNTER — TRANSFERRED RECORDS (OUTPATIENT)
Dept: HEALTH INFORMATION MANAGEMENT | Facility: CLINIC | Age: 29
End: 2024-05-16

## 2024-06-04 ENCOUNTER — OFFICE VISIT (OUTPATIENT)
Dept: FAMILY MEDICINE | Facility: CLINIC | Age: 29
End: 2024-06-04
Payer: COMMERCIAL

## 2024-06-04 ENCOUNTER — MYC MEDICAL ADVICE (OUTPATIENT)
Dept: FAMILY MEDICINE | Facility: CLINIC | Age: 29
End: 2024-06-04

## 2024-06-04 VITALS
SYSTOLIC BLOOD PRESSURE: 133 MMHG | HEIGHT: 67 IN | WEIGHT: 293 LBS | HEART RATE: 115 BPM | RESPIRATION RATE: 115 BRPM | TEMPERATURE: 98.2 F | OXYGEN SATURATION: 93 % | BODY MASS INDEX: 45.99 KG/M2 | DIASTOLIC BLOOD PRESSURE: 83 MMHG

## 2024-06-04 DIAGNOSIS — J02.9 SORE THROAT: Primary | ICD-10-CM

## 2024-06-04 LAB
DEPRECATED S PYO AG THROAT QL EIA: NEGATIVE
GROUP A STREP BY PCR: NOT DETECTED

## 2024-06-04 PROCEDURE — 99213 OFFICE O/P EST LOW 20 MIN: CPT | Performed by: FAMILY MEDICINE

## 2024-06-04 PROCEDURE — 87651 STREP A DNA AMP PROBE: CPT | Performed by: FAMILY MEDICINE

## 2024-06-04 ASSESSMENT — PATIENT HEALTH QUESTIONNAIRE - PHQ9
SUM OF ALL RESPONSES TO PHQ QUESTIONS 1-9: 11
SUM OF ALL RESPONSES TO PHQ QUESTIONS 1-9: 11
10. IF YOU CHECKED OFF ANY PROBLEMS, HOW DIFFICULT HAVE THESE PROBLEMS MADE IT FOR YOU TO DO YOUR WORK, TAKE CARE OF THINGS AT HOME, OR GET ALONG WITH OTHER PEOPLE: VERY DIFFICULT

## 2024-06-04 NOTE — TELEPHONE ENCOUNTER
Called pt and scheduled appt to see Dr. Cabrales today.      Victorino Malagon, BSN RN  [facility]
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
## 2024-07-05 ENCOUNTER — PATIENT OUTREACH (OUTPATIENT)
Dept: CARE COORDINATION | Facility: CLINIC | Age: 29
End: 2024-07-05
Payer: COMMERCIAL

## 2024-07-19 ENCOUNTER — OFFICE VISIT (OUTPATIENT)
Dept: FAMILY MEDICINE | Facility: CLINIC | Age: 29
End: 2024-07-19
Payer: COMMERCIAL

## 2024-07-19 ENCOUNTER — PATIENT OUTREACH (OUTPATIENT)
Dept: CARE COORDINATION | Facility: CLINIC | Age: 29
End: 2024-07-19

## 2024-07-19 VITALS
TEMPERATURE: 98.3 F | HEART RATE: 99 BPM | BODY MASS INDEX: 45.99 KG/M2 | DIASTOLIC BLOOD PRESSURE: 85 MMHG | HEIGHT: 67 IN | RESPIRATION RATE: 16 BRPM | WEIGHT: 293 LBS | OXYGEN SATURATION: 98 % | SYSTOLIC BLOOD PRESSURE: 119 MMHG

## 2024-07-19 DIAGNOSIS — F33.1 MAJOR DEPRESSIVE DISORDER, RECURRENT EPISODE, MODERATE (H): Primary | ICD-10-CM

## 2024-07-19 DIAGNOSIS — L71.0 PERIORAL DERMATITIS: ICD-10-CM

## 2024-07-19 DIAGNOSIS — Z76.0 ENCOUNTER FOR MEDICATION REFILL: ICD-10-CM

## 2024-07-19 DIAGNOSIS — E28.2 PCOS (POLYCYSTIC OVARIAN SYNDROME): ICD-10-CM

## 2024-07-19 DIAGNOSIS — R06.09 DYSPNEA ON EXERTION: ICD-10-CM

## 2024-07-19 DIAGNOSIS — M79.672 LEFT FOOT PAIN: ICD-10-CM

## 2024-07-19 PROCEDURE — 99214 OFFICE O/P EST MOD 30 MIN: CPT | Performed by: FAMILY MEDICINE

## 2024-07-19 PROCEDURE — G2211 COMPLEX E/M VISIT ADD ON: HCPCS | Performed by: FAMILY MEDICINE

## 2024-07-19 RX ORDER — LOSARTAN POTASSIUM 50 MG/1
50 TABLET ORAL DAILY
Qty: 90 TABLET | Refills: 3 | Status: SHIPPED | OUTPATIENT
Start: 2024-07-19

## 2024-07-19 RX ORDER — METFORMIN HCL 500 MG
500 TABLET, EXTENDED RELEASE 24 HR ORAL
Qty: 90 TABLET | Refills: 3 | Status: SHIPPED | OUTPATIENT
Start: 2024-07-19

## 2024-07-19 RX ORDER — LISDEXAMFETAMINE DIMESYLATE 20 MG/1
CAPSULE ORAL
COMMUNITY
Start: 2024-07-17

## 2024-07-19 ASSESSMENT — PATIENT HEALTH QUESTIONNAIRE - PHQ9
SUM OF ALL RESPONSES TO PHQ QUESTIONS 1-9: 13
SUM OF ALL RESPONSES TO PHQ QUESTIONS 1-9: 13
10. IF YOU CHECKED OFF ANY PROBLEMS, HOW DIFFICULT HAVE THESE PROBLEMS MADE IT FOR YOU TO DO YOUR WORK, TAKE CARE OF THINGS AT HOME, OR GET ALONG WITH OTHER PEOPLE: VERY DIFFICULT

## 2024-07-19 NOTE — PROGRESS NOTES
"  Assessment & Plan     Major depressive disorder, recurrent episode, moderate (H): Follows with mental health provider, continue medications without change.    Encounter for medication refill/HTN: BP at goal- refilled.   - losartan (COZAAR) 50 MG tablet  Dispense: 90 tablet; Refill: 3    PCOS (polycystic ovarian syndrome): Refilled.   - metFORMIN (GLUCOPHAGE XR) 500 MG 24 hr tablet  Dispense: 90 tablet; Refill: 3    Dyspnea on exertion: Multiple risk factors.   - Echocardiogram Exercise Stress    Left foot pain: Lateral, forefoot pain- unclear etiology. Tenderness over cuboid region. No concern for fracture based on examination. Declines XR. Will start with wearing supportive shoes/inserts. If no improvement, consider referral to podiatry or PT.    Perioral dermatitis:   - metroNIDAZOLE (METROCREAM) 0.75 % external cream  Dispense: 45 g; Refill: 1      The longitudinal plan of care for the diagnosis(es)/condition(s) as documented were addressed during this visit. Due to the added complexity in care, I will continue to support Liz in the subsequent management and with ongoing continuity of care.      BMI  Estimated body mass index is 48.68 kg/m  as calculated from the following:    Height as of this encounter: 1.699 m (5' 6.9\").    Weight as of this encounter: 140.6 kg (309 lb 14.4 oz).       Depression Screening Follow Up      Follow Up Actions Taken  Referred patient back to mental health provider      Colby   Liz is a 29 year old, presenting for the following health issues:  Musculoskeletal Problem      7/19/2024    11:44 AM   Additional Questions   Roomed by Alan restrepo   Accompanied by Self     Musculoskeletal Problem    History of Present Illness       Reason for visit:  Annual, foot pain, fatigue  Symptom onset:  More than a month  Symptoms include:  Pain in left food after standing  Symptom intensity:  Moderate  Symptom progression:  Staying the same  Had these symptoms before:  No  What makes it " "worse:  Using the foot for long periods of time  What makes it better:  Resting    Liz eats 0-1 servings of fruits and vegetables daily.Liz consumes 2 sweetened beverage(s) daily.Liz exercises with enough effort to increase Liz's heart rate 9 or less minutes per day.  Liz exercises with enough effort to increase Liz's heart rate 3 or less days per week.   Liz is taking medications regularly.           Dyspnea- wondering if maybe she has long covid. Feels better with sitting, worse with exertion. Hx of HTN on losartan.     Follows with mental health provider- on vyvanse      Objective    /85 (BP Location: Left arm, Patient Position: Sitting, Cuff Size: Adult Large)   Pulse 99   Temp 98.3  F (36.8  C) (Oral)   Resp 16   Ht 1.699 m (5' 6.9\")   Wt 140.6 kg (309 lb 14.4 oz)   LMP 07/19/2024 (Exact Date)   SpO2 98%   BMI 48.68 kg/m    Body mass index is 48.68 kg/m .  Physical Exam   Gen: well appearing  CV: RRR no m/r/g  Resp: CTAB  MSK: tenderness over lateral forefoot, strength intact, normal range of motion in foot        Signed Electronically by: Kaylee Nugent MD    "

## 2024-07-22 ENCOUNTER — MYC MEDICAL ADVICE (OUTPATIENT)
Dept: FAMILY MEDICINE | Facility: CLINIC | Age: 29
End: 2024-07-22
Payer: COMMERCIAL

## 2024-08-05 PROBLEM — F84.0 AUTISM SPECTRUM DISORDER: Status: ACTIVE | Noted: 2024-08-05

## 2024-08-05 PROBLEM — F90.0 ADHD (ATTENTION DEFICIT HYPERACTIVITY DISORDER), INATTENTIVE TYPE: Status: ACTIVE | Noted: 2024-08-05

## 2024-08-06 ENCOUNTER — MYC MEDICAL ADVICE (OUTPATIENT)
Dept: DERMATOLOGY | Facility: CLINIC | Age: 29
End: 2024-08-06
Payer: COMMERCIAL

## 2024-08-06 DIAGNOSIS — L70.0 ACNE VULGARIS: ICD-10-CM

## 2024-08-06 RX ORDER — ERYTHROMYCIN AND BENZOYL PEROXIDE 30; 50 MG/G; MG/G
GEL TOPICAL 2 TIMES DAILY
Qty: 46.6 G | Refills: 3 | Status: SHIPPED | OUTPATIENT
Start: 2024-08-06

## 2024-08-28 ENCOUNTER — MYC MEDICAL ADVICE (OUTPATIENT)
Dept: FAMILY MEDICINE | Facility: CLINIC | Age: 29
End: 2024-08-28
Payer: COMMERCIAL

## 2024-08-28 DIAGNOSIS — K64.4 EXTERNAL HEMORRHOIDS: Primary | ICD-10-CM

## 2024-09-09 ENCOUNTER — MYC MEDICAL ADVICE (OUTPATIENT)
Dept: FAMILY MEDICINE | Facility: CLINIC | Age: 29
End: 2024-09-09
Payer: COMMERCIAL

## 2024-09-17 ENCOUNTER — MYC MEDICAL ADVICE (OUTPATIENT)
Dept: FAMILY MEDICINE | Facility: CLINIC | Age: 29
End: 2024-09-17
Payer: COMMERCIAL

## 2024-09-17 NOTE — TELEPHONE ENCOUNTER
Responded to patient via Archie Toribio RN  [facility]

## 2024-10-04 ENCOUNTER — HOSPITAL ENCOUNTER (OUTPATIENT)
Dept: CARDIOLOGY | Facility: HOSPITAL | Age: 29
Discharge: HOME | End: 2024-10-04
Attending: FAMILY MEDICINE | Admitting: FAMILY MEDICINE
Payer: COMMERCIAL

## 2024-10-04 DIAGNOSIS — R06.09 DYSPNEA ON EXERTION: ICD-10-CM

## 2024-10-04 LAB
CV STRESS CURRENT BP HE: NORMAL
CV STRESS CURRENT HR HE: 110
CV STRESS CURRENT HR HE: 124
CV STRESS CURRENT HR HE: 135
CV STRESS CURRENT HR HE: 135
CV STRESS CURRENT HR HE: 139
CV STRESS CURRENT HR HE: 140
CV STRESS CURRENT HR HE: 141
CV STRESS CURRENT HR HE: 156
CV STRESS CURRENT HR HE: 156
CV STRESS CURRENT HR HE: 80
CV STRESS CURRENT HR HE: 81
CV STRESS CURRENT HR HE: 81
CV STRESS CURRENT HR HE: 82
CV STRESS CURRENT HR HE: 83
CV STRESS CURRENT HR HE: 83
CV STRESS CURRENT HR HE: 88
CV STRESS CURRENT HR HE: 90
CV STRESS CURRENT HR HE: 93
CV STRESS CURRENT HR HE: 97
CV STRESS CURRENT HR HE: 99
CV STRESS DEVIATION TIME HE: NORMAL
CV STRESS ECHO PERCENT HR HE: NORMAL
CV STRESS EXERCISE STAGE HE: NORMAL
CV STRESS EXERCISE STAGE REACHED HE: NORMAL
CV STRESS FINAL RESTING BP HE: NORMAL
CV STRESS FINAL RESTING HR HE: 90
CV STRESS MAX HR HE: 157
CV STRESS MAX TREADMILL GRADE HE: 12
CV STRESS MAX TREADMILL SPEED HE: 2.5
CV STRESS PEAK DIA BP HE: NORMAL
CV STRESS PEAK SYS BP HE: NORMAL
CV STRESS PHASE HE: NORMAL
CV STRESS PROTOCOL HE: NORMAL
CV STRESS REASON STOPPED HE: NORMAL
CV STRESS RESTING PT POSITION HE: NORMAL
CV STRESS RESTING PT POSITION HE: NORMAL
CV STRESS ST DEVIATION AMOUNT HE: NORMAL
CV STRESS ST DEVIATION ELEVATION HE: NORMAL
CV STRESS ST EVELATION AMOUNT HE: NORMAL
CV STRESS SYMPTOMS HE: NORMAL
CV STRESS TEST TYPE HE: NORMAL
CV STRESS TOTAL STAGE TIME MIN 1 HE: NORMAL
STRESS ECHO BASELINE DIASTOLIC HE: 118
STRESS ECHO BASELINE HR: NORMAL
STRESS ECHO BASELINE SYSTOLIC BP: 168
STRESS ECHO LAST STRESS DIASTOLIC BP: 108
STRESS ECHO LAST STRESS HR: 156
STRESS ECHO LAST STRESS SYSTOLIC BP: 240
STRESS ECHO POST ESTIMATED WORKLOAD: 6.5
STRESS ECHO POST EXERCISE DUR MIN: 4
STRESS ECHO POST EXERCISE DUR SEC: 0
STRESS ECHO TARGET HR: 162

## 2024-10-04 PROCEDURE — 93016 CV STRESS TEST SUPVJ ONLY: CPT | Performed by: INTERNAL MEDICINE

## 2024-10-04 PROCEDURE — 93325 DOPPLER ECHO COLOR FLOW MAPG: CPT | Mod: 26 | Performed by: INTERNAL MEDICINE

## 2024-10-04 PROCEDURE — 93321 DOPPLER ECHO F-UP/LMTD STD: CPT | Mod: 26 | Performed by: INTERNAL MEDICINE

## 2024-10-04 PROCEDURE — 93352 ADMIN ECG CONTRAST AGENT: CPT | Performed by: INTERNAL MEDICINE

## 2024-10-04 PROCEDURE — 93350 STRESS TTE ONLY: CPT | Mod: 26 | Performed by: INTERNAL MEDICINE

## 2024-10-04 PROCEDURE — 255N000002 HC RX 255 OP 636: Performed by: FAMILY MEDICINE

## 2024-10-04 PROCEDURE — 93018 CV STRESS TEST I&R ONLY: CPT | Performed by: INTERNAL MEDICINE

## 2024-10-04 RX ADMIN — PERFLUTREN 2 ML: 6.52 INJECTION, SUSPENSION INTRAVENOUS at 10:02

## 2024-10-22 ENCOUNTER — MYC MEDICAL ADVICE (OUTPATIENT)
Dept: FAMILY MEDICINE | Facility: CLINIC | Age: 29
End: 2024-10-22
Payer: COMMERCIAL

## 2024-11-23 ENCOUNTER — MYC MEDICAL ADVICE (OUTPATIENT)
Dept: FAMILY MEDICINE | Facility: CLINIC | Age: 29
End: 2024-11-23
Payer: COMMERCIAL

## 2024-11-26 NOTE — TELEPHONE ENCOUNTER
No fax number given. Unable to locate online. Unable to reach representative with phone number given by patient.     Thus, patient asked to contact medical records at [facility] to coordinate this task.

## 2025-01-10 ENCOUNTER — TELEPHONE (OUTPATIENT)
Dept: DERMATOLOGY | Facility: CLINIC | Age: 30
End: 2025-01-10

## 2025-01-10 DIAGNOSIS — L73.2 HIDRADENITIS SUPPURATIVA: Primary | ICD-10-CM

## 2025-01-12 ENCOUNTER — MYC MEDICAL ADVICE (OUTPATIENT)
Dept: FAMILY MEDICINE | Facility: CLINIC | Age: 30
End: 2025-01-12
Payer: COMMERCIAL

## 2025-01-12 DIAGNOSIS — F41.9 ANXIETY: Primary | ICD-10-CM

## 2025-01-13 ENCOUNTER — TELEPHONE (OUTPATIENT)
Dept: DERMATOLOGY | Facility: CLINIC | Age: 30
End: 2025-01-13
Payer: COMMERCIAL

## 2025-01-13 DIAGNOSIS — L73.2 HIDRADENITIS SUPPURATIVA: ICD-10-CM

## 2025-01-13 DIAGNOSIS — Z51.81 THERAPEUTIC DRUG MONITORING: ICD-10-CM

## 2025-01-13 RX ORDER — LORAZEPAM 1 MG/1
TABLET ORAL
Qty: 1 TABLET | Refills: 0 | Status: SHIPPED | OUTPATIENT
Start: 2025-01-13

## 2025-01-13 RX ORDER — ADALIMUMAB-ATTO 40 MG/.4ML
40 INJECTION SUBCUTANEOUS
Qty: 2.4 ML | Refills: 1 | OUTPATIENT
Start: 2025-01-13

## 2025-01-13 RX ORDER — ADALIMUMAB-ATTO 80 MG/.8ML
160 INJECTION SUBCUTANEOUS ONCE
Qty: 2.4 ML | Refills: 0 | OUTPATIENT
Start: 2025-01-13 | End: 2025-01-13

## 2025-01-13 NOTE — TELEPHONE ENCOUNTER
PA Initiation    Medication: AMJEVITA 80 MG/0.8ML SC SOAJ  Insurance Company: [employer] ([facility]) - Phone [phone] Fax [phone]  Pharmacy Filling the Rx:    Filling Pharmacy Phone:    Filling Pharmacy Fax:    Start Date: 1/13/2025    Key:[id number]

## 2025-01-14 ENCOUNTER — TELEPHONE (OUTPATIENT)
Dept: DERMATOLOGY | Facility: CLINIC | Age: 30
End: 2025-01-14
Payer: COMMERCIAL

## 2025-01-14 NOTE — TELEPHONE ENCOUNTER
MTM referral from: [facility] visit (referral by provider)    MTM referral outreach attempt #2 on January 14, 2025 at 11:32 AM      Outcome: Patient not reachable after several attempts, routed to Pharmacist Team/Provider as an FYI    Use HB for the carrier/Plan on the flowsheet      Pharmaxis Message Sent    MALACHI Man

## 2025-01-18 NOTE — TELEPHONE ENCOUNTER
Prior Authorization Approval    Medication: AMJEVITA 80 MG/0.8ML SC SOAJ  Authorization Effective Date: 1/14/2025  Authorization Expiration Date: 7/13/2025  Approved Dose/Quantity: 2.4ml/28 days, then 1.6ml/28 days   Reference #: [id number]   Insurance Company: [employer] ([facility]) - Phone [phone] Fax [phone]  Expected CoPay: $ 50  CoPay Card Available:      Financial Assistance Needed:   Which Pharmacy is filling the prescription:    Pharmacy Notified:   Patient Notified:

## 2025-01-18 NOTE — TELEPHONE ENCOUNTER
When running a test claim it looks like insurance will only approve the 40mg/0.8ml pens. Can you resend the loading dose and maintenance dose using the 40mg/0.8ml pens

## 2025-01-20 ENCOUNTER — TELEPHONE (OUTPATIENT)
Dept: PHARMACY | Facility: CLINIC | Age: 30
End: 2025-01-20

## 2025-01-20 RX ORDER — ADALIMUMAB-ATTO 40 MG/.4ML
INJECTION SUBCUTANEOUS
Qty: 8 ML | Refills: 3 | Status: SHIPPED | OUTPATIENT
Start: 2025-01-20

## 2025-01-20 RX ORDER — ADALIMUMAB-ATTO 40 MG/.8ML
40 INJECTION SUBCUTANEOUS
Status: CANCELLED | OUTPATIENT
Start: 2025-01-20

## 2025-01-21 RX ORDER — ADALIMUMAB-ATTO 40 MG/.8ML
INJECTION SUBCUTANEOUS
Qty: 7.2 ML | Refills: 3 | Status: SHIPPED | OUTPATIENT
Start: 2025-01-21

## 2025-03-16 ENCOUNTER — MYC MEDICAL ADVICE (OUTPATIENT)
Dept: FAMILY MEDICINE | Facility: CLINIC | Age: 30
End: 2025-03-16
Payer: COMMERCIAL

## 2025-03-17 ENCOUNTER — TELEPHONE (OUTPATIENT)
Dept: FAMILY MEDICINE | Facility: CLINIC | Age: 30
End: 2025-03-17
Payer: COMMERCIAL

## 2025-03-17 NOTE — TELEPHONE ENCOUNTER
Patient Quality Outreach    Patient is due for the following:   Asthma  -  ACT needed and AAP  Cervical Cancer Screening - PAP Needed  Depression  -  PHQ-9 needed  Physical Preventive Adult Physical     Action(s) Taken:   Patient has upcoming appointment, these items will be addressed at that time.    Type of outreach:    Chart review performed, no outreach needed.    Questions for provider review:    None           Ti Faria MA

## 2025-03-17 NOTE — TELEPHONE ENCOUNTER
Writer responded to patient message via Ludia.     Shanthi Vasquez, MSN, RN   [facility]

## 2025-03-20 ASSESSMENT — ASTHMA QUESTIONNAIRES
QUESTION_4 LAST FOUR WEEKS HOW OFTEN HAVE YOU USED YOUR RESCUE INHALER OR NEBULIZER MEDICATION (SUCH AS ALBUTEROL): NOT AT ALL
QUESTION_3 LAST FOUR WEEKS HOW OFTEN DID YOUR ASTHMA SYMPTOMS (WHEEZING, COUGHING, SHORTNESS OF BREATH, CHEST TIGHTNESS OR PAIN) WAKE YOU UP AT NIGHT OR EARLIER THAN USUAL IN THE MORNING: NOT AT ALL
ACT_TOTALSCORE: 19
QUESTION_2 LAST FOUR WEEKS HOW OFTEN HAVE YOU HAD SHORTNESS OF BREATH: MORE THAN ONCE A DAY
QUESTION_1 LAST FOUR WEEKS HOW MUCH OF THE TIME DID YOUR ASTHMA KEEP YOU FROM GETTING AS MUCH DONE AT WORK, SCHOOL OR AT HOME: A LITTLE OF THE TIME
QUESTION_5 LAST FOUR WEEKS HOW WOULD YOU RATE YOUR ASTHMA CONTROL: WELL CONTROLLED

## 2025-03-20 ASSESSMENT — PATIENT HEALTH QUESTIONNAIRE - PHQ9
SUM OF ALL RESPONSES TO PHQ QUESTIONS 1-9: 19
SUM OF ALL RESPONSES TO PHQ QUESTIONS 1-9: 19
10. IF YOU CHECKED OFF ANY PROBLEMS, HOW DIFFICULT HAVE THESE PROBLEMS MADE IT FOR YOU TO DO YOUR WORK, TAKE CARE OF THINGS AT HOME, OR GET ALONG WITH OTHER PEOPLE: VERY DIFFICULT

## 2025-03-21 ENCOUNTER — VIRTUAL VISIT (OUTPATIENT)
Dept: FAMILY MEDICINE | Facility: CLINIC | Age: 30
End: 2025-03-21
Payer: COMMERCIAL

## 2025-03-21 DIAGNOSIS — R06.00 DYSPNEA, UNSPECIFIED TYPE: Primary | ICD-10-CM

## 2025-03-21 DIAGNOSIS — F33.1 MAJOR DEPRESSIVE DISORDER, RECURRENT EPISODE, MODERATE (H): ICD-10-CM

## 2025-03-21 DIAGNOSIS — K21.9 GASTROESOPHAGEAL REFLUX DISEASE WITHOUT ESOPHAGITIS: ICD-10-CM

## 2025-03-21 PROBLEM — E66.01 MORBID OBESITY (H): Status: RESOLVED | Noted: 2021-09-26 | Resolved: 2025-03-21

## 2025-03-21 PROCEDURE — 98006 SYNCH AUDIO-VIDEO EST MOD 30: CPT | Performed by: FAMILY MEDICINE

## 2025-03-21 RX ORDER — CETIRIZINE HYDROCHLORIDE 10 MG/1
10 TABLET ORAL DAILY
Qty: 90 TABLET | Refills: 1 | Status: SHIPPED | OUTPATIENT
Start: 2025-03-21

## 2025-03-21 RX ORDER — OMEPRAZOLE 40 MG/1
40 CAPSULE, DELAYED RELEASE ORAL DAILY
Qty: 30 CAPSULE | Refills: 2 | Status: SHIPPED | OUTPATIENT
Start: 2025-03-21

## 2025-03-21 RX ORDER — FLUTICASONE PROPIONATE 50 MCG
1 SPRAY, SUSPENSION (ML) NASAL DAILY
Qty: 16 G | Refills: 3 | Status: SHIPPED | OUTPATIENT
Start: 2025-03-21

## 2025-03-21 NOTE — PROGRESS NOTES
"Walt is a 29 year old who is being evaluated via a billable video visit.    How would you like to obtain your AVS? MyChart  If the video visit is dropped, the invitation should be resent by: Text to cell phone: [phone]  Will anyone else be joining your video visit? No      Assessment & Plan     Dyspnea, unspecified type: Has started workup including normal echo stress test 10/2024, sleep study showed no LYNNE. Chronic issue- seemed to worsen post-covid. Will trial zyrtec and intranasal steroids. Plan for PFTs and CT chest for workup.    - cetirizine (ZYRTEC) 10 MG tablet  Dispense: 90 tablet; Refill: 1  - fluticasone (FLONASE) 50 MCG/ACT nasal spray  Dispense: 16 g; Refill: 3  - General PFT Lab (Please always keep checked)  - Pulmonary Function Test  - CT Chest w Contrast    Gastroesophageal reflux disease without esophagitis: continue H2 blocker, add PPI. Refer to GI for endoscopy for refractory GERD.  - omeprazole (PRILOSEC) 40 MG DR capsule  Dispense: 30 capsule; Refill: 2  - Adult GI  Referral - Procedure Only    Major depressive disorder, recurrent episode, moderate (H): Has therapist and psychiatrist and has appointment later today.    BMI  Estimated body mass index is 48.68 kg/m  as calculated from the following:    Height as of 7/19/24: 1.699 m (5' 6.9\").    Weight as of 7/19/24: 140.6 kg (309 lb 14.4 oz).       Depression Screening Follow Up        3/20/2025     3:36 PM   PHQ   PHQ-9 Total Score 19    Q9: Thoughts of better off dead/self-harm past 2 weeks More than half the days   F/U: Thoughts of suicide or self-harm Yes   F/U: Self harm-plan No   F/U: Self-harm action No   F/U: Safety concerns No       Patient-reported                     Follow Up Actions Taken  Crisis resource information provided in the After Visit Summary  Referred patient back to mental health provider    Discussed the following ways the patient can remain in a safe environment:  be around others      Subjective   Walt is a " 29 year old, presenting for the following health issues:  Gastrophageal Reflux      3/21/2025     7:13 AM   Additional Questions   Roomed by Debby KAMARA   Accompanied by self     History of Present Illness       Reason for visit:  Increased heartburn, tickle in throat    Walt eats 0-1 servings of fruits and vegetables daily.Walt consumes 0 sweetened beverage(s) daily.Walt exercises with enough effort to increase Walt's heart rate 10 to 19 minutes per day.  Walt exercises with enough effort to increase Walt's heart rate 3 or less days per week.   Walt is taking medications regularly.                    Objective       Reflux symptoms worse. Wakes up in the middle of night. Take H2 but no significant improvement.     Has noticed that her chronic fatigue and dyspnea have not improved. Had stress echo that was unremarkable. Feels her symptoms worsened post-covid but she did even have symptoms when she was younger. Had sleep study that showed no LYNNE.    Vitals:  No vitals were obtained today due to virtual visit.    Physical Exam   GENERAL: alert and no distress  PSYCH: Appropriate affect, tone, and pace of words          Video-Visit Details    Type of service:  Video Visit   Originating Location (pt. Location): Home    Distant Location (provider location):  On-site  Platform used for Video Visit: Ayesha  Signed Electronically by: Kaylee Nugent MD

## 2025-04-02 ENCOUNTER — MYC MEDICAL ADVICE (OUTPATIENT)
Dept: FAMILY MEDICINE | Facility: CLINIC | Age: 30
End: 2025-04-02
Payer: COMMERCIAL

## 2025-04-03 ENCOUNTER — TELEPHONE (OUTPATIENT)
Dept: GASTROENTEROLOGY | Facility: CLINIC | Age: 30
End: 2025-04-03
Payer: COMMERCIAL

## 2025-04-03 NOTE — TELEPHONE ENCOUNTER
"Patient transfer of care to [facility]        Endoscopy Scheduling Screen    Have you had any respiratory illness or flu-like symptoms in the last 10 days?  No    What is your communication preference for Instructions and/or Bowel Prep?   MyChart    What insurance is in the chart?  Other:  [facility]    Ordering/Referring Provider: Kaylee Nugent MD   (If ordering provider performs procedure, schedule with ordering provider unless otherwise instructed. )    BMI: Estimated body mass index is 48.68 kg/m  as calculated from the following:    Height as of 7/19/24: 1.699 m (5' 6.9\").    Weight as of 7/19/24: 140.6 kg (309 lb 14.4 oz).     Sedation Ordered  MAC/deep sedation.   BMI<= 45 45 < BMI <= 48 48 < BMI < = 50  BMI > 50   No Restrictions No MG ASC  No ESSC  [facility] with exceptions Hospital Only OR Only       Do you have a history of malignant hyperthermia?  No    (Females) Are you currently pregnant?   No     Have you been diagnosed or told you have pulmonary hypertension?   No    Do you have an LVAD?  No    Have you been told you have moderate to severe sleep apnea?  No.    Have you been told you have COPD, asthma, or any other lung disease?  Yes     What breathing problems do you have?  Asthma     Do you use home oxygen?  No    Have your breathing problems required an ED visit or hospitalization in the last year?  No.    Has your doctor ordered any cardiac tests like echo, angiogram, stress test, ablation, or EKG, that you have not completed yet?  No    Do you  have a history of any heart conditions?  No     Have you ever had or are you waiting for an organ transplant?  No. Continue scheduling, no site restrictions.    Have you had a stroke or transient ischemic attack (TIA aka \"mini stroke\") in the last 2 years?   No.    Have you been diagnosed with or been told you have cirrhosis of the liver?   No.    Are you currently on dialysis?   No    Do you need assistance transferring?   No    BMI: Estimated " "body mass index is 48.68 kg/m  as calculated from the following:    Height as of 7/19/24: 1.699 m (5' 6.9\").    Weight as of 7/19/24: 140.6 kg (309 lb 14.4 oz).     Is patients BMI > 40 and scheduling location [facility]?  No    Do you take an injectable or oral medication for weight loss or diabetes (excluding insulin)?  No    Do you take the medication Naltrexone?  No    Do you take blood thinners?  No       Prep   Are you currently on dialysis or do you have chronic kidney disease?  No    Do you have a diagnosis of diabetes?  No    Do you have a diagnosis of cystic fibrosis (CF)?  No    On a regular basis do you go 3 -5 days between bowel movements?  No    BMI > 40?  Yes (Extended Prep)    Preferred Pharmacy:    [facility] [address] [place] [address] - [address]  [address]  [facility] [address]  Phone: [phone] Fax: [phone]    "

## 2025-04-03 NOTE — TELEPHONE ENCOUNTER
Patient was informed referral placed on 3/21 and pt is scheduling within [facility] so need to fax the referral.

## 2025-05-04 DIAGNOSIS — Z84.89 FAMILY HISTORY OF BRAIN TUMOR: Primary | ICD-10-CM

## 2025-05-05 ENCOUNTER — PATIENT OUTREACH (OUTPATIENT)
Dept: ONCOLOGY | Facility: CLINIC | Age: 30
End: 2025-05-05
Payer: COMMERCIAL

## 2025-05-05 NOTE — PROGRESS NOTES
Writer received referral to Cancer Risk Management/Genetic Counseling.    Referred for:    Family history of brain tumor    Referred By    Provider Department Location Phone   Kaylee Nugent MD [facility] [phone]       Referral reviewed for appropriate plan, and sent to New Patient Scheduling ([phone]) for completion.    Rachael Victoria RN, BSN  Oncology New Patient Nurse Navigator   [facility]

## 2025-05-06 DIAGNOSIS — R79.89 ELEVATED SERUM CREATININE: ICD-10-CM

## 2025-05-06 DIAGNOSIS — R73.09 ELEVATED GLUCOSE: Primary | ICD-10-CM

## 2025-05-15 DIAGNOSIS — R06.00 DYSPNEA, UNSPECIFIED TYPE: ICD-10-CM

## 2025-05-15 LAB
DLCOCOR-%PRED-PRE: 134 %
DLCOCOR-PRE: 31.97 ML/MIN/MMHG
DLCOUNC-%PRED-PRE: 133 %
DLCOUNC-PRE: 31.67 ML/MIN/MMHG
DLCOUNC-PRED: 23.74 ML/MIN/MMHG
ERV-%PRED-PRE: 22 %
ERV-PRE: 0.31 L
ERV-PRED: 1.37 L
EXPTIME-PRE: 4.76 SEC
FEF2575-%PRED-PRE: 90 %
FEF2575-PRE: 3.24 L/SEC
FEF2575-PRED: 3.59 L/SEC
FEFMAX-%PRED-PRE: 84 %
FEFMAX-PRE: 6.35 L/SEC
FEFMAX-PRED: 7.48 L/SEC
FEV1-%PRED-PRE: 97 %
FEV1-PRE: 3.21 L
FEV1FEV6-PRE: 81 %
FEV1FEV6-PRED: 85 %
FEV1FVC-PRE: 81 %
FEV1FVC-PRED: 85 %
FEV1SVC-PRE: 76 %
FEV1SVC-PRED: 83 %
FIFMAX-PRE: 7.23 L/SEC
FRCPLETH-%PRED-PRE: 62 %
FRCPLETH-PRE: 1.9 L
FRCPLETH-PRED: 3.04 L
FVC-%PRED-PRE: 101 %
FVC-PRE: 3.95 L
FVC-PRED: 3.87 L
IC-%PRED-PRE: 142 %
IC-PRE: 3.91 L
IC-PRED: 2.74 L
RVPLETH-%PRED-PRE: 100 %
RVPLETH-PRE: 1.59 L
RVPLETH-PRED: 1.58 L
TLCPLETH-%PRED-PRE: 106 %
TLCPLETH-PRE: 5.81 L
TLCPLETH-PRED: 5.44 L
VA-%PRED-PRE: 102 %
VA-PRE: 5.48 L
VC-%PRED-PRE: 106 %
VC-PRE: 4.22 L
VC-PRED: 3.97 L

## 2025-05-19 ENCOUNTER — MYC REFILL (OUTPATIENT)
Dept: DERMATOLOGY | Facility: CLINIC | Age: 30
End: 2025-05-19
Payer: COMMERCIAL

## 2025-05-19 ENCOUNTER — RESULTS FOLLOW-UP (OUTPATIENT)
Dept: FAMILY MEDICINE | Facility: CLINIC | Age: 30
End: 2025-05-19

## 2025-05-19 DIAGNOSIS — R61 GENERALIZED HYPERHIDROSIS: ICD-10-CM

## 2025-05-19 DIAGNOSIS — Z76.0 ENCOUNTER FOR MEDICATION REFILL: ICD-10-CM

## 2025-05-21 RX ORDER — GLYCOPYRROLATE 1 MG/1
2 TABLET ORAL DAILY
Qty: 180 TABLET | Refills: 2 | Status: SHIPPED | OUTPATIENT
Start: 2025-05-21

## 2025-05-21 NOTE — TELEPHONE ENCOUNTER
Routing to provider for approval/denial since med not on protocol.    Shanna HENAO RN  Dermatology   [phone]

## 2025-06-17 ENCOUNTER — TELEPHONE (OUTPATIENT)
Dept: FAMILY MEDICINE | Facility: CLINIC | Age: 30
End: 2025-06-17
Payer: COMMERCIAL

## 2025-06-17 NOTE — TELEPHONE ENCOUNTER
Patient Quality Outreach    Patient is due for the following:   Asthma  -  Asthma follow-up visit  Physical Preventive Adult Physical      Topic Date Due    Pneumococcal Vaccine (1 of 2 - PCV) Never done    Zoster (Shingles) Vaccine (1 of 2) Never done    COVID-19 Vaccine (4 - 2024-25 season) 09/01/2024       Action(s) Taken:   Schedule a Adult Preventative    Type of outreach:    Phone, left message for patient/parent to call back.    Questions for provider review:    None         Bettina Mcintyre MA  Chart routed to None.

## 2025-07-28 DIAGNOSIS — Z76.0 ENCOUNTER FOR MEDICATION REFILL: ICD-10-CM

## 2025-07-28 DIAGNOSIS — K21.9 GASTROESOPHAGEAL REFLUX DISEASE WITHOUT ESOPHAGITIS: ICD-10-CM

## 2025-07-29 RX ORDER — OMEPRAZOLE 40 MG/1
40 CAPSULE, DELAYED RELEASE ORAL DAILY
Qty: 30 CAPSULE | Refills: 0 | Status: SHIPPED | OUTPATIENT
Start: 2025-07-29

## 2025-07-29 RX ORDER — LOSARTAN POTASSIUM 50 MG/1
50 TABLET ORAL DAILY
Qty: 90 TABLET | Refills: 3 | Status: SHIPPED | OUTPATIENT
Start: 2025-07-29

## 2025-08-03 ENCOUNTER — HEALTH MAINTENANCE LETTER (OUTPATIENT)
Age: 30
End: 2025-08-03

## 2025-08-12 ENCOUNTER — TELEPHONE (OUTPATIENT)
Dept: FAMILY MEDICINE | Facility: CLINIC | Age: 30
End: 2025-08-12
Payer: COMMERCIAL

## 2025-08-18 DIAGNOSIS — K21.9 GASTROESOPHAGEAL REFLUX DISEASE WITHOUT ESOPHAGITIS: ICD-10-CM

## 2025-08-19 RX ORDER — OMEPRAZOLE 40 MG/1
40 CAPSULE, DELAYED RELEASE ORAL DAILY
Qty: 30 CAPSULE | Refills: 6 | Status: SHIPPED | OUTPATIENT
Start: 2025-08-19